# Patient Record
Sex: FEMALE | Race: ASIAN | NOT HISPANIC OR LATINO | URBAN - METROPOLITAN AREA
[De-identification: names, ages, dates, MRNs, and addresses within clinical notes are randomized per-mention and may not be internally consistent; named-entity substitution may affect disease eponyms.]

---

## 2022-04-13 ENCOUNTER — OUTPATIENT (OUTPATIENT)
Dept: INPATIENT UNIT | Facility: HOSPITAL | Age: 32
LOS: 1 days | Discharge: HOME | End: 2022-04-13
Payer: MEDICAID

## 2022-04-13 ENCOUNTER — EMERGENCY (EMERGENCY)
Facility: HOSPITAL | Age: 32
LOS: 1 days | Discharge: STILL ADMITTED OR ADMINISTRATIVE | End: 2022-04-13
Admitting: EMERGENCY MEDICINE
Payer: SELF-PAY

## 2022-04-13 VITALS — SYSTOLIC BLOOD PRESSURE: 113 MMHG | HEART RATE: 83 BPM | DIASTOLIC BLOOD PRESSURE: 74 MMHG

## 2022-04-13 VITALS — DIASTOLIC BLOOD PRESSURE: 67 MMHG | SYSTOLIC BLOOD PRESSURE: 176 MMHG | HEART RATE: 100 BPM

## 2022-04-13 VITALS
TEMPERATURE: 99 F | SYSTOLIC BLOOD PRESSURE: 117 MMHG | DIASTOLIC BLOOD PRESSURE: 70 MMHG | HEART RATE: 94 BPM | RESPIRATION RATE: 18 BRPM | OXYGEN SATURATION: 100 %

## 2022-04-13 DIAGNOSIS — Z3A.00 WEEKS OF GESTATION OF PREGNANCY NOT SPECIFIED: ICD-10-CM

## 2022-04-13 DIAGNOSIS — O26.899 OTHER SPECIFIED PREGNANCY RELATED CONDITIONS, UNSPECIFIED TRIMESTER: ICD-10-CM

## 2022-04-13 LAB
APPEARANCE UR: ABNORMAL
BACTERIA # UR AUTO: ABNORMAL
BILIRUB UR-MCNC: NEGATIVE — SIGNIFICANT CHANGE UP
COLOR SPEC: COLORLESS — SIGNIFICANT CHANGE UP
DIFF PNL FLD: NEGATIVE — SIGNIFICANT CHANGE UP
EPI CELLS # UR: 8 /HPF — HIGH (ref 0–5)
GLUCOSE UR QL: NEGATIVE — SIGNIFICANT CHANGE UP
HYALINE CASTS # UR AUTO: 0 /LPF — SIGNIFICANT CHANGE UP (ref 0–7)
KETONES UR-MCNC: NEGATIVE — SIGNIFICANT CHANGE UP
LEUKOCYTE ESTERASE UR-ACNC: NEGATIVE — SIGNIFICANT CHANGE UP
NITRITE UR-MCNC: NEGATIVE — SIGNIFICANT CHANGE UP
PH UR: 7 — SIGNIFICANT CHANGE UP (ref 5–8)
PROT UR-MCNC: NEGATIVE — SIGNIFICANT CHANGE UP
RBC CASTS # UR COMP ASSIST: 1 /HPF — SIGNIFICANT CHANGE UP (ref 0–4)
SP GR SPEC: 1.01 — SIGNIFICANT CHANGE UP (ref 1–1.05)
UROBILINOGEN FLD QL: SIGNIFICANT CHANGE UP
WBC UR QL: 2 /HPF — SIGNIFICANT CHANGE UP (ref 0–5)

## 2022-04-13 PROCEDURE — L9996: CPT

## 2022-04-13 NOTE — ED ADULT TRIAGE NOTE - CHIEF COMPLAINT QUOTE
Pt is 22 weeks pregnant, BRE 08/14. is c/o lower abdominal pain and not feeling fetal movements for past two days. Called  L&D and pt is to be seen upstairs.

## 2022-04-13 NOTE — OB PROVIDER TRIAGE NOTE - ADDITIONAL INSTRUCTIONS
d/w Dr Lopez discharge home resolved decreased fetal movement @ 22.3 wks  patient advised not to fast  maternal and fetal surveillance reassuring  rest activity as tolerated  increase water intake  keep all OB appointments  return for vaginal bleeding leakage of fluid decreased fetal movement or any concerns   labor precautions instructed  v/w discharge instructions given with verbal understanding by patient

## 2022-04-13 NOTE — OB PROVIDER TRIAGE NOTE - NSHPPHYSICALEXAM_GEN_ALL_CORE
abd soft gravid NT  LS clear bilaterally  Cv RRR  TAS:  low lying placenta  +FM +FHR  MAVIS: MFP: WNL 5  EFW: 345g/12oz  toco: no contractions no pain 0/10  Vital Signs Last 24 Hrs  T(C): 36.9 (13 Apr 2022 22:24), Max: 37.2 (13 Apr 2022 21:37)  T(F): 98.4 (13 Apr 2022 22:24), Max: 99 (13 Apr 2022 21:37)  HR: 83 (13 Apr 2022 23:50) (75 - 100)  BP: 113/74 (13 Apr 2022 23:50) (110/71 - 176/67)  BP(mean): --  RR: 16 (13 Apr 2022 22:24) (16 - 18)  SpO2: 100% (13 Apr 2022 21:37) (100% - 100%)

## 2022-04-13 NOTE — OB RN TRIAGE NOTE - FALL HARM RISK - UNIVERSAL INTERVENTIONS
Bed in lowest position, wheels locked, appropriate side rails in place/Call bell, personal items and telephone in reach/Instruct patient to call for assistance before getting out of bed or chair/Non-slip footwear when patient is out of bed/[place] to call system/Physically safe environment - no spills, clutter or unnecessary equipment/Purposeful Proactive Rounding/Room/bathroom lighting operational, light cord in reach

## 2022-04-13 NOTE — OB PROVIDER TRIAGE NOTE - NSHPLABSRESULTS_GEN_ALL_CORE
Urinalysis Basic - ( 2022 22:51 )    Color: Colorless / Appearance: Slightly Turbid / S.006 / pH: x  Gluc: x / Ketone: Negative  / Bili: Negative / Urobili: <2 mg/dL   Blood: x / Protein: Negative / Nitrite: Negative   Leuk Esterase: Negative / RBC: 1 /HPF / WBC 2 /HPF   Sq Epi: x / Non Sq Epi: 8 /HPF / Bacteria: Few

## 2022-04-13 NOTE — OB PROVIDER TRIAGE NOTE - HISTORY OF PRESENT ILLNESS
31 yo  @ 22.3 wks c/o decreased fetal movement x 2 days pt is fasting. pt reports pain at umbilicus x 5 minutes 2 days ago, denies pain, cramping, or tight abdomen. denies lof or vb. denies fever chills dysuria ha n/v new swelling epigastric pain vision changes cp sob  or cough. last saw OB 3/31. AP course complciated by low lying placenta- no bleeding events. next sono .     meds: PNV iron  all: denies  PMH: denies  PSH: denies  gyn hx: denies  ob hx: denies

## 2022-04-14 PROCEDURE — 76819 FETAL BIOPHYS PROFIL W/O NST: CPT | Mod: 26

## 2022-04-14 PROCEDURE — 99203 OFFICE O/P NEW LOW 30 MIN: CPT

## 2022-04-14 PROCEDURE — 76816 OB US FOLLOW-UP PER FETUS: CPT | Mod: 26

## 2022-07-13 ENCOUNTER — OUTPATIENT (OUTPATIENT)
Dept: OUTPATIENT SERVICES | Facility: HOSPITAL | Age: 32
LOS: 1 days | Discharge: HOME | End: 2022-07-13

## 2022-07-13 ENCOUNTER — EMERGENCY (EMERGENCY)
Facility: HOSPITAL | Age: 32
LOS: 1 days | Discharge: STILL ADMITTED OR ADMINISTRATIVE | End: 2022-07-13
Admitting: EMERGENCY MEDICINE

## 2022-07-13 VITALS — HEART RATE: 81 BPM | OXYGEN SATURATION: 99 %

## 2022-07-13 VITALS — SYSTOLIC BLOOD PRESSURE: 103 MMHG | HEART RATE: 77 BPM | DIASTOLIC BLOOD PRESSURE: 58 MMHG

## 2022-07-13 VITALS
OXYGEN SATURATION: 99 % | RESPIRATION RATE: 18 BRPM | TEMPERATURE: 98 F | HEART RATE: 68 BPM | DIASTOLIC BLOOD PRESSURE: 59 MMHG | SYSTOLIC BLOOD PRESSURE: 101 MMHG

## 2022-07-13 DIAGNOSIS — O26.899 OTHER SPECIFIED PREGNANCY RELATED CONDITIONS, UNSPECIFIED TRIMESTER: ICD-10-CM

## 2022-07-13 DIAGNOSIS — Z3A.00 WEEKS OF GESTATION OF PREGNANCY NOT SPECIFIED: ICD-10-CM

## 2022-07-13 PROBLEM — Z78.9 OTHER SPECIFIED HEALTH STATUS: Chronic | Status: ACTIVE | Noted: 2022-04-13

## 2022-07-13 LAB
APPEARANCE UR: CLEAR — SIGNIFICANT CHANGE UP
BILIRUB UR-MCNC: NEGATIVE — SIGNIFICANT CHANGE UP
COLOR SPEC: SIGNIFICANT CHANGE UP
DIFF PNL FLD: NEGATIVE — SIGNIFICANT CHANGE UP
GLUCOSE BLDC GLUCOMTR-MCNC: 132 MG/DL — HIGH (ref 70–99)
GLUCOSE BLDC GLUCOMTR-MCNC: 134 MG/DL — HIGH (ref 70–99)
GLUCOSE UR QL: NEGATIVE — SIGNIFICANT CHANGE UP
KETONES UR-MCNC: NEGATIVE — SIGNIFICANT CHANGE UP
LEUKOCYTE ESTERASE UR-ACNC: NEGATIVE — SIGNIFICANT CHANGE UP
NITRITE UR-MCNC: NEGATIVE — SIGNIFICANT CHANGE UP
PH UR: 6.5 — SIGNIFICANT CHANGE UP (ref 5–8)
PROT UR-MCNC: NEGATIVE — SIGNIFICANT CHANGE UP
SP GR SPEC: 1.01 — SIGNIFICANT CHANGE UP (ref 1–1.05)
UROBILINOGEN FLD QL: SIGNIFICANT CHANGE UP

## 2022-07-13 PROCEDURE — 99214 OFFICE O/P EST MOD 30 MIN: CPT

## 2022-07-13 PROCEDURE — 76818 FETAL BIOPHYS PROFILE W/NST: CPT | Mod: 26

## 2022-07-13 PROCEDURE — L9996: CPT

## 2022-07-13 RX ORDER — SODIUM CHLORIDE 9 MG/ML
1000 INJECTION, SOLUTION INTRAVENOUS ONCE
Refills: 0 | Status: COMPLETED | OUTPATIENT
Start: 2022-07-13 | End: 2022-07-13

## 2022-07-13 RX ORDER — SODIUM CHLORIDE 9 MG/ML
1000 INJECTION, SOLUTION INTRAVENOUS
Refills: 0 | Status: DISCONTINUED | OUTPATIENT
Start: 2022-07-13 | End: 2022-07-27

## 2022-07-13 RX ORDER — SIMETHICONE 80 MG/1
160 TABLET, CHEWABLE ORAL ONCE
Refills: 0 | Status: COMPLETED | OUTPATIENT
Start: 2022-07-13 | End: 2022-07-13

## 2022-07-13 RX ORDER — ACETAMINOPHEN 500 MG
975 TABLET ORAL ONCE
Refills: 0 | Status: COMPLETED | OUTPATIENT
Start: 2022-07-13 | End: 2022-07-13

## 2022-07-13 RX ADMIN — Medication 975 MILLIGRAM(S): at 07:57

## 2022-07-13 RX ADMIN — SODIUM CHLORIDE 125 MILLILITER(S): 9 INJECTION, SOLUTION INTRAVENOUS at 06:29

## 2022-07-13 RX ADMIN — SODIUM CHLORIDE 1000 MILLILITER(S): 9 INJECTION, SOLUTION INTRAVENOUS at 05:53

## 2022-07-13 RX ADMIN — SIMETHICONE 160 MILLIGRAM(S): 80 TABLET, CHEWABLE ORAL at 07:56

## 2022-07-13 NOTE — OB PROVIDER TRIAGE NOTE - HISTORY OF PRESENT ILLNESS
ID [id number]   32 year old  at 35.3 presents with complaints of painful contractions reports pain 10/10. Denies LOF, denies VB, reports fetal movement. Patient states she has GDMA1. Denies dysuria, denies increased urinary frequency.    PMH: Denies  PSH: Denies  Allergies: Denies   Denies hx of anxiety, depression   denies use of etoh, drugs, tobacco during pregnancy   ID [id number]   32 year old  at 35.3 presents with complaints of painful contractions reports pain 10/10. Denies LOF, denies VB, reports fetal movement. Patient states she has GDMA1. Denies dysuria, denies increased urinary frequency.    Last bowel movement yesterday, last passed gas this AM.    PMH: Denies  PSH: Denies  Allergies: Denies   Denies hx of anxiety, depression   denies use of etoh, drugs, tobacco during pregnancy   ID [id number]   32 year old  at 35.3 presents with complaints of painful contractions reports pain 10/10. Denies LOF, denies VB, reports fetal movement. Patient states she has GDMA1. Denies dysuria, denies increased urinary frequency.    Last bowel movement yesterday, last passed flatus this AM.    PMH: Denies  PSH: Denies  Allergies: Denies   Denies hx of anxiety, depression   denies use of etoh, drugs, tobacco during pregnancy

## 2022-07-13 NOTE — ED ADULT TRIAGE NOTE - CHIEF COMPLAINT QUOTE
Pt arrives with EMS for abd pain. 35wks, BRE , , -ROM, no urge to push. Patient with gestational diabetes, . Follows with AMEYA Phillips. To be seen in L&D.

## 2022-07-13 NOTE — OB PROVIDER TRIAGE NOTE - NSOBPROVIDERNOTE_OBGYN_ALL_OB_FT
NST in progress       0515: Dr. Ferraro at bedside   VE 0/50/-3  For UA   for 1L bolus of LR.   repeat , pt reports eating rice and meat at midnight - Dr. Ferraro aware no new orders at this time   NST continues will monitor. NST in progress       0515: Dr. Ferraro at bedside   VE 0/50/-3  For UA   for 1L bolus of LR.   repeat , pt reports eating rice and meat at midnight - Dr. Ferraro aware no new orders at this time   NST continues will monitor.    0630: patient reports feeling a little better but still has pain after bolus.  D/w Dr. Ferraro, patient to be reexamined at 0730. NST in progress       0515: Dr. Ferraro at bedside   VE 0/50/-3  For UA   for 1L bolus of LR.   repeat , pt reports eating rice and meat at midnight - Dr. Ferraro aware no new orders at this time   NST continues will monitor.    0630: patient reports feeling a little better but still states she has pain after LR bolus.   D/w Dr. Ferraro, patient to be reexamined at 0730.    report given for change of shift NST in progress       0515: Dr. Ferraro at bedside   VE 0/50/-3  For UA   for 1L bolus of LR.   repeat , pt reports eating rice and meat at midnight - Dr. Ferraro aware no new orders at this time   NST continues will monitor.    0630: patient reports feeling a little better but still states she has pain after LR bolus.   D/w Dr. Ferraro, patient to be reexamined at 0730.    report given for change of shift    0730: exam repeated 1/50/-3  Tylenol and simethicone given NST in progress       0515: Dr. Ferraro at bedside   VE 0/50/-3  For UA   for 1L bolus of LR.   repeat , pt reports eating rice and meat at midnight - Dr. Ferraro aware no new orders at this time   NST continues will monitor.    0630: patient reports feeling a little better but still states she has pain after LR bolus.   D/w Dr. Ferraro, patient to be reexamined at 0730.    report given for change of shift    0730: exam repeated 1/50/-3  0800: Tylenol and simethicone given NST in progress       0515: Dr. Ferraro at bedside   VE 0/50/-3  For UA   for 1L bolus of LR.   repeat , pt reports eating rice and meat at midnight - Dr. Ferraro aware no new orders at this time   NST continues will monitor.    0630: patient reports feeling a little better but still states she has pain after LR bolus.   D/w Dr. Ferraro, patient to be reexamined at 0730.    report given for change of shift    0730: exam repeated 1/50/-3  0800: Tylenol and simethicone given    0845: pt feeling better, pain has decreased  - no contractions  - Dr. Bernal at bedside NST in progress       0515: Dr. Ferraro at bedside   VE 0/50/-3  For UA   for 1L bolus of LR.   repeat , pt reports eating rice and meat at midnight - Dr. Ferraro aware no new orders at this time   NST continues will monitor.    0630: patient reports feeling a little better but still states she has pain after LR bolus.   D/w Dr. Ferraro, patient to be reexamined at 0730.    report given for change of shift    0730: VE repeated 1/50/-3, pain unchanged  0800: Tylenol and simethicone given    0845: pt feeling better after medication, pain has decreased  - No contractions on NST  - Dr. Bernal at bedside  - Pt reexamined, VE unchanged at 1/50/-3  - No evidence of PTL  - Cleared for discharge by Dr. Bernal    - Patient stable for discharge home with adequate outpatient follow up  - Instructed patient to follow up with OB/GYN on 7/16 as scheduled  - Educated and discussed prelabor precautions  - Educated and discussed concerning signs/symptoms to call OB/GYN and return to L&D including but not limited to vaginal bleeding, leakage of fluid, painful contractions, decreased fetal movement, fever/chills, shortness of breath, chest pain, rash, difficulty ambulating, nausea/vomiting/diarrhea, worsening of symptoms  - Patient states she understands and agrees with assessment and plan, all questions answered

## 2022-07-13 NOTE — OB PROVIDER TRIAGE NOTE - NSHPPHYSICALEXAM_GEN_ALL_CORE
Vital Signs Last 24 Hrs  T(C): 37 (13 Jul 2022 04:41), Max: 37.0 (13 Jul 2022 04:39)  T(F): 98.6 (13 Jul 2022 04:41), Max: 98.6 (13 Jul 2022 04:39)  HR: 77 (13 Jul 2022 04:41) (68 - 77)  BP: 103/58 (13 Jul 2022 04:41) (101/59 - 103/58)  BP(mean): --  RR: 16 (13 Jul 2022 04:41) (16 - 18)  SpO2: 99% (13 Jul 2022 04:11) (99% - 99%)    Assessment reveals VSS  Abdomen soft, NT, gravid  Cat 1 tracing, [id number] moderate variability with accels, spontaneous deceleration noted 0449, no reoccurring decels. irregular contractions   Transabdominal Ultrasound-anterior placenta, MAVIS 15.33, cephalic presentation   Vaginal Exam- 0/50/-3   A&Ox3  Lungs- clear bilateral  Heart- normal rate and rhythm Vital Signs Last 24 Hrs  T(C): 37 (13 Jul 2022 04:41), Max: 37.0 (13 Jul 2022 04:39)  T(F): 98.6 (13 Jul 2022 04:41), Max: 98.6 (13 Jul 2022 04:39)  HR: 77 (13 Jul 2022 04:41) (68 - 77)  BP: 103/58 (13 Jul 2022 04:41) (101/59 - 103/58)  BP(mean): --  RR: 16 (13 Jul 2022 04:41) (16 - 18)  SpO2: 99% (13 Jul 2022 04:11) (99% - 99%)    Assessment reveals VSS  Abdomen soft, NT, gravid  Cat 1 tracing,  moderate variability with accels, spontaneous deceleration noted 0449, no reoccurring decels. irregular contractions   Transabdominal Ultrasound-anterior placenta, MAVIS 15.33, cephalic presentation, bpp 8/8   Vaginal Exam- 0/50/-3   A&Ox3  Lungs- clear bilateral  Heart- normal rate and rhythm

## 2022-07-13 NOTE — OB PROVIDER TRIAGE NOTE - ADDITIONAL INSTRUCTIONS
- Patient stable for discharge home with adequate outpatient follow up  - Instructed patient to follow up with OB/GYN on 7/16 as scheduled  - Educated and discussed prelabor precautions  - Educated and discussed concerning signs/symptoms to call OB/GYN and return to L&D including but not limited to vaginal bleeding, leakage of fluid, painful contractions, decreased fetal movement, fever/chills, shortness of breath, chest pain, rash, difficulty ambulating, nausea/vomiting/diarrhea, worsening of symptoms  - Patient states she understands and agrees with assessment and plan, all questions answered

## 2022-07-13 NOTE — OB RN TRIAGE NOTE - FALL HARM RISK - UNIVERSAL INTERVENTIONS
Bed in lowest position, wheels locked, appropriate side rails in place/Call bell, personal items and telephone in reach/Instruct patient to call for assistance before getting out of bed or chair/Non-slip footwear when patient is out of bed/[place] to call system/Physically safe environment - no spills, clutter or unnecessary equipment/Purposeful Proactive Rounding/Room/bathroom lighting operational, light cord in reach

## 2022-07-13 NOTE — OB PROVIDER TRIAGE NOTE - NSHPLABSRESULTS_GEN_ALL_CORE
Urinalysis Basic - ( 2022 05:45 )    Color: Light Yellow / Appearance: Clear / S.009 / pH: x  Gluc: x / Ketone: Negative  / Bili: Negative / Urobili: <2 mg/dL   Blood: x / Protein: Negative / Nitrite: Negative   Leuk Esterase: Negative / RBC: x / WBC x   Sq Epi: x / Non Sq Epi: x / Bacteria: x

## 2022-07-13 NOTE — OB PROVIDER TRIAGE NOTE - PLAN OF CARE
- no evidence of  labor  - pt discharged  - continue with scheduled appt on   -  labor precautions given

## 2022-07-21 ENCOUNTER — INPATIENT (INPATIENT)
Facility: HOSPITAL | Age: 32
LOS: 2 days | Discharge: HOME | End: 2022-07-24
Attending: SPECIALIST | Admitting: SPECIALIST

## 2022-07-21 VITALS
DIASTOLIC BLOOD PRESSURE: 60 MMHG | RESPIRATION RATE: 17 BRPM | TEMPERATURE: 99 F | HEART RATE: 99 BPM | SYSTOLIC BLOOD PRESSURE: 99 MMHG

## 2022-07-21 DIAGNOSIS — O26.899 OTHER SPECIFIED PREGNANCY RELATED CONDITIONS, UNSPECIFIED TRIMESTER: ICD-10-CM

## 2022-07-21 DIAGNOSIS — Z3A.00 WEEKS OF GESTATION OF PREGNANCY NOT SPECIFIED: ICD-10-CM

## 2022-07-21 NOTE — OB RN TRIAGE NOTE - FALL HARM RISK - UNIVERSAL INTERVENTIONS
Bed in lowest position, wheels locked, appropriate side rails in place/Call bell, personal items and telephone in reach/Instruct patient to call for assistance before getting out of bed or chair/Non-slip footwear when patient is out of bed/[place] to call system/Physically safe environment - no spills, clutter or unnecessary equipment/Purposeful Proactive Rounding/Room/bathroom lighting operational, light cord in reach

## 2022-07-22 DIAGNOSIS — O24.419 GESTATIONAL DIABETES MELLITUS IN PREGNANCY, UNSPECIFIED CONTROL: ICD-10-CM

## 2022-07-22 DIAGNOSIS — O24.410 GESTATIONAL DIABETES MELLITUS IN PREGNANCY, DIET CONTROLLED: ICD-10-CM

## 2022-07-22 LAB
BASOPHILS # BLD AUTO: 0.01 K/UL — SIGNIFICANT CHANGE UP (ref 0–0.2)
BASOPHILS NFR BLD AUTO: 0.1 % — SIGNIFICANT CHANGE UP (ref 0–2)
BLD GP AB SCN SERPL QL: NEGATIVE — SIGNIFICANT CHANGE UP
COVID-19 SPIKE DOMAIN AB INTERP: POSITIVE
COVID-19 SPIKE DOMAIN ANTIBODY RESULT: >250 U/ML — HIGH
EOSINOPHIL # BLD AUTO: 0.01 K/UL — SIGNIFICANT CHANGE UP (ref 0–0.5)
EOSINOPHIL NFR BLD AUTO: 0.1 % — SIGNIFICANT CHANGE UP (ref 0–6)
GLUCOSE BLDC GLUCOMTR-MCNC: 85 MG/DL — SIGNIFICANT CHANGE UP (ref 70–99)
HCT VFR BLD CALC: 32.7 % — LOW (ref 34.5–45)
HGB BLD-MCNC: 11.2 G/DL — LOW (ref 11.5–15.5)
IANC: 6.96 K/UL — SIGNIFICANT CHANGE UP (ref 1.8–7.4)
IMM GRANULOCYTES NFR BLD AUTO: 0.5 % — SIGNIFICANT CHANGE UP (ref 0–1.5)
LYMPHOCYTES # BLD AUTO: 0.97 K/UL — LOW (ref 1–3.3)
LYMPHOCYTES # BLD AUTO: 11.5 % — LOW (ref 13–44)
MCHC RBC-ENTMCNC: 30.6 PG — SIGNIFICANT CHANGE UP (ref 27–34)
MCHC RBC-ENTMCNC: 34.3 GM/DL — SIGNIFICANT CHANGE UP (ref 32–36)
MCV RBC AUTO: 89.3 FL — SIGNIFICANT CHANGE UP (ref 80–100)
MONOCYTES # BLD AUTO: 0.48 K/UL — SIGNIFICANT CHANGE UP (ref 0–0.9)
MONOCYTES NFR BLD AUTO: 5.7 % — SIGNIFICANT CHANGE UP (ref 2–14)
NEUTROPHILS # BLD AUTO: 6.96 K/UL — SIGNIFICANT CHANGE UP (ref 1.8–7.4)
NEUTROPHILS NFR BLD AUTO: 82.1 % — HIGH (ref 43–77)
NRBC # BLD: 0 /100 WBCS — SIGNIFICANT CHANGE UP
NRBC # FLD: 0 K/UL — SIGNIFICANT CHANGE UP
PLATELET # BLD AUTO: 217 K/UL — SIGNIFICANT CHANGE UP (ref 150–400)
RBC # BLD: 3.66 M/UL — LOW (ref 3.8–5.2)
RBC # FLD: 13.6 % — SIGNIFICANT CHANGE UP (ref 10.3–14.5)
RH IG SCN BLD-IMP: POSITIVE — SIGNIFICANT CHANGE UP
RH IG SCN BLD-IMP: POSITIVE — SIGNIFICANT CHANGE UP
SARS-COV-2 IGG+IGM SERPL QL IA: >250 U/ML — HIGH
SARS-COV-2 IGG+IGM SERPL QL IA: POSITIVE
SARS-COV-2 RNA SPEC QL NAA+PROBE: SIGNIFICANT CHANGE UP
T PALLIDUM AB TITR SER: NEGATIVE — SIGNIFICANT CHANGE UP
WBC # BLD: 8.47 K/UL — SIGNIFICANT CHANGE UP (ref 3.8–10.5)
WBC # FLD AUTO: 8.47 K/UL — SIGNIFICANT CHANGE UP (ref 3.8–10.5)

## 2022-07-22 RX ORDER — SODIUM CHLORIDE 9 MG/ML
1000 INJECTION, SOLUTION INTRAVENOUS
Refills: 0 | Status: DISCONTINUED | OUTPATIENT
Start: 2022-07-22 | End: 2022-07-22

## 2022-07-22 RX ORDER — OXYCODONE HYDROCHLORIDE 5 MG/1
5 TABLET ORAL
Refills: 0 | Status: DISCONTINUED | OUTPATIENT
Start: 2022-07-22 | End: 2022-07-24

## 2022-07-22 RX ORDER — SIMETHICONE 80 MG/1
80 TABLET, CHEWABLE ORAL EVERY 4 HOURS
Refills: 0 | Status: DISCONTINUED | OUTPATIENT
Start: 2022-07-22 | End: 2022-07-24

## 2022-07-22 RX ORDER — DIBUCAINE 1 %
1 OINTMENT (GRAM) RECTAL EVERY 6 HOURS
Refills: 0 | Status: DISCONTINUED | OUTPATIENT
Start: 2022-07-22 | End: 2022-07-24

## 2022-07-22 RX ORDER — HYDROCORTISONE 1 %
1 OINTMENT (GRAM) TOPICAL EVERY 6 HOURS
Refills: 0 | Status: DISCONTINUED | OUTPATIENT
Start: 2022-07-22 | End: 2022-07-24

## 2022-07-22 RX ORDER — SODIUM CHLORIDE 9 MG/ML
3 INJECTION INTRAMUSCULAR; INTRAVENOUS; SUBCUTANEOUS EVERY 8 HOURS
Refills: 0 | Status: DISCONTINUED | OUTPATIENT
Start: 2022-07-22 | End: 2022-07-24

## 2022-07-22 RX ORDER — OXYTOCIN 10 UNIT/ML
333.33 VIAL (ML) INJECTION
Qty: 20 | Refills: 0 | Status: DISCONTINUED | OUTPATIENT
Start: 2022-07-22 | End: 2022-07-23

## 2022-07-22 RX ORDER — DIPHENHYDRAMINE HCL 50 MG
25 CAPSULE ORAL EVERY 6 HOURS
Refills: 0 | Status: DISCONTINUED | OUTPATIENT
Start: 2022-07-22 | End: 2022-07-24

## 2022-07-22 RX ORDER — ACETAMINOPHEN 500 MG
975 TABLET ORAL
Refills: 0 | Status: DISCONTINUED | OUTPATIENT
Start: 2022-07-22 | End: 2022-07-24

## 2022-07-22 RX ORDER — AER TRAVELER 0.5 G/1
1 SOLUTION RECTAL; TOPICAL EVERY 4 HOURS
Refills: 0 | Status: DISCONTINUED | OUTPATIENT
Start: 2022-07-22 | End: 2022-07-24

## 2022-07-22 RX ORDER — FAMOTIDINE 10 MG/ML
20 INJECTION INTRAVENOUS DAILY
Refills: 0 | Status: DISCONTINUED | OUTPATIENT
Start: 2022-07-22 | End: 2022-07-24

## 2022-07-22 RX ORDER — OXYCODONE HYDROCHLORIDE 5 MG/1
5 TABLET ORAL ONCE
Refills: 0 | Status: DISCONTINUED | OUTPATIENT
Start: 2022-07-22 | End: 2022-07-24

## 2022-07-22 RX ORDER — SODIUM CHLORIDE 9 MG/ML
1000 INJECTION INTRAMUSCULAR; INTRAVENOUS; SUBCUTANEOUS
Refills: 0 | Status: DISCONTINUED | OUTPATIENT
Start: 2022-07-22 | End: 2022-07-22

## 2022-07-22 RX ORDER — TETANUS TOXOID, REDUCED DIPHTHERIA TOXOID AND ACELLULAR PERTUSSIS VACCINE, ADSORBED 5; 2.5; 8; 8; 2.5 [IU]/.5ML; [IU]/.5ML; UG/.5ML; UG/.5ML; UG/.5ML
0.5 SUSPENSION INTRAMUSCULAR ONCE
Refills: 0 | Status: DISCONTINUED | OUTPATIENT
Start: 2022-07-22 | End: 2022-07-24

## 2022-07-22 RX ORDER — MAGNESIUM HYDROXIDE 400 MG/1
30 TABLET, CHEWABLE ORAL
Refills: 0 | Status: DISCONTINUED | OUTPATIENT
Start: 2022-07-22 | End: 2022-07-24

## 2022-07-22 RX ORDER — KETOROLAC TROMETHAMINE 30 MG/ML
30 SYRINGE (ML) INJECTION ONCE
Refills: 0 | Status: DISCONTINUED | OUTPATIENT
Start: 2022-07-22 | End: 2022-07-22

## 2022-07-22 RX ORDER — LANOLIN
1 OINTMENT (GRAM) TOPICAL EVERY 6 HOURS
Refills: 0 | Status: DISCONTINUED | OUTPATIENT
Start: 2022-07-22 | End: 2022-07-24

## 2022-07-22 RX ORDER — IBUPROFEN 200 MG
600 TABLET ORAL EVERY 6 HOURS
Refills: 0 | Status: COMPLETED | OUTPATIENT
Start: 2022-07-22 | End: 2023-06-20

## 2022-07-22 RX ORDER — PRAMOXINE HYDROCHLORIDE 150 MG/15G
1 AEROSOL, FOAM RECTAL EVERY 4 HOURS
Refills: 0 | Status: DISCONTINUED | OUTPATIENT
Start: 2022-07-22 | End: 2022-07-24

## 2022-07-22 RX ORDER — BENZOCAINE 10 %
1 GEL (GRAM) MUCOUS MEMBRANE EVERY 6 HOURS
Refills: 0 | Status: DISCONTINUED | OUTPATIENT
Start: 2022-07-22 | End: 2022-07-24

## 2022-07-22 RX ORDER — IBUPROFEN 200 MG
600 TABLET ORAL EVERY 6 HOURS
Refills: 0 | Status: DISCONTINUED | OUTPATIENT
Start: 2022-07-22 | End: 2022-07-24

## 2022-07-22 RX ADMIN — SODIUM CHLORIDE 3 MILLILITER(S): 9 INJECTION INTRAMUSCULAR; INTRAVENOUS; SUBCUTANEOUS at 13:01

## 2022-07-22 RX ADMIN — Medication 600 MILLIGRAM(S): at 18:04

## 2022-07-22 RX ADMIN — SODIUM CHLORIDE 125 MILLILITER(S): 9 INJECTION INTRAMUSCULAR; INTRAVENOUS; SUBCUTANEOUS at 04:47

## 2022-07-22 RX ADMIN — FAMOTIDINE 20 MILLIGRAM(S): 10 INJECTION INTRAVENOUS at 11:08

## 2022-07-22 RX ADMIN — Medication 975 MILLIGRAM(S): at 10:45

## 2022-07-22 RX ADMIN — Medication 975 MILLIGRAM(S): at 10:29

## 2022-07-22 RX ADMIN — SODIUM CHLORIDE 3 MILLILITER(S): 9 INJECTION INTRAMUSCULAR; INTRAVENOUS; SUBCUTANEOUS at 22:29

## 2022-07-22 RX ADMIN — Medication 30 MILLIGRAM(S): at 05:00

## 2022-07-22 RX ADMIN — Medication 1000 MILLIUNIT(S)/MIN: at 04:47

## 2022-07-22 RX ADMIN — Medication 600 MILLIGRAM(S): at 23:32

## 2022-07-22 RX ADMIN — Medication 600 MILLIGRAM(S): at 19:00

## 2022-07-22 NOTE — OB PROVIDER DELIVERY SUMMARY - NSSELHIDDEN_OBGYN_ALL_OB_FT
[NS_DeliveryAttending1_OBGYN_ALL_OB_FT:[id number]],[NS_DeliveryRN_OBGYN_ALL_OB_FT:[id number]],[NS_DeliveryAssist1_OBGYN_ALL_OB_FT:[id number]]

## 2022-07-22 NOTE — OB PROVIDER TRIAGE NOTE - HISTORY OF PRESENT ILLNESS
ID [id number]   32 year old  at 36 4/7 presents with complaints of painful contractions reports pain 10/10. Denies LOF, denies VB, reports fetal movement. Patient states she has GDMA1. Denies dysuria, denies increased urinary frequency.    Last bowel movement yesterday, last passed flatus this AM.    PMH: Denies  PSH: Denies  Allergies: Denies   Denies hx of anxiety, depression   denies use of etoh, drugs, tobacco during pregnancy     32 year old  at 36 4/7 presents with complaints of painful contractions reports pain 10/10. Denies LOF, denies VB, reports fetal movement. Patient states she has GDMA1. Denies dysuria, denies increased urinary frequency.    Last bowel movement yesterday, last passed flatus this AM.    PMH: Denies  PSH: Denies  Allergies: Denies   Denies hx of anxiety, depression   denies use of etoh, drugs, tobacco during pregnancy   32 year old  at 36 4/7 presents with complaints of painful contractions  Q 5-7 minutes reports pain 10/10. Denies dysuria, denies increased urinary frequency.     leakage of fluid, and reports fetal movement.  Denies fever, chills, headaches, changes in vision, chest pain, palpitations, shortness of breath, cough, nausea, vomiting, diarrhea, constipation, urinary symptoms, edema.    Prenatal care with Dr. Phillips  Prenatal course is complicated GDMA-1    GBS status is negative  2022  Patient denies signs and symptoms of COVID 19; denies symptomatic illness; fully vaccinated.    No adverse reactions to anesthesia, no objections to blood transfusions if clinically indicated.  OB hx: primigravida  PMH: Denies  PSH: Denies  Allergies: Denies   Denies hx of anxiety, depression   denies use of etoh, drugs, tobacco during pregnancy

## 2022-07-22 NOTE — OB PROVIDER TRIAGE NOTE - NSHPPHYSICALEXAM_GEN_ALL_CORE
Assessment reveals VSS  Abdomen soft, NT, gravid  Cat 1 tracing,  moderate variability with accels, , no reoccurring decels. irregular contractions   Transabdominal Ultrasound-anterior placenta, MAVIS 15.33, cephalic presentation, bpp 8/8   Vaginal Exam- 0/50/-3   A&Ox3  Lungs- clear bilateral  Heart- normal rate and rhythm Gen: NAD  Head: NC/AT  Cardio: RRR  Resp: CTABL, no wheezing  Abdomen: Soft, Non tender  Extremities: No LE edema bilaterally    NST and BPP done to assess fetal surveillance and results as follows:  NST-->FHR: 145 HR baseline, moderate variability, accelerations present, no decelerations, Category 1.  Rhome: Contractions present, irregular,    BPP:  vertex confirmed by bedside sonogram    SVE: 5/70/-3

## 2022-07-22 NOTE — OB RN PATIENT PROFILE - FALL HARM RISK - UNIVERSAL INTERVENTIONS
Bed in lowest position, wheels locked, appropriate side rails in place/Call bell, personal items and telephone in reach/Instruct patient to call for assistance before getting out of bed or chair/Non-slip footwear when patient is out of bed/[place] to call system/Physically safe environment - no spills, clutter or unnecessary equipment/Purposeful Proactive Rounding/Room/bathroom lighting operational, light cord in reach

## 2022-07-22 NOTE — DISCHARGE NOTE OB - CARE PROVIDER_API CALL
Kailey Phillips  OBSTETRICS AND GYNECOLOGY  [address], [address]  [place] [address]  Phone: [phone]  Fax: [phone]  Follow Up Time:

## 2022-07-22 NOTE — DISCHARGE NOTE OB - MEDICATION SUMMARY - MEDICATIONS TO TAKE
I will START or STAY ON the medications listed below when I get home from the hospital:    ibuprofen 600 mg oral tablet  -- 1 tab(s) by mouth every 6 hours, As Needed  -- Indication: For Normal vaginal delivery    PNV Prenatal oral tablet  -- 1 tab(s) by mouth once a day  -- Indication: For Normal vaginal delivery

## 2022-07-22 NOTE — DISCHARGE NOTE OB - BRAND OF COVID-19 VACCINATION
Date:  19  RE: Ernestina Fonseca    : 1984  Estimated Date of Delivery: 3/29/19  EGA: 38w4d  OB/GYN: [place] [facility]  Insulin controlled gestational diabetes      Blood glucose log from patient e-mail  Reports a delayed injection site reaction but does report pain with injections  Hydrocortisone cream as recommended by her OB helps with site reaction  Continued no response regarding permission from her OB to use anti-histamine orally        Current regimen:  Lantus- 84 units at bedtime, split into 2 equal doses injected in 2 different sites  2000 calorie gestational diabetes diet with 3 meals and 3 snacks  Self- monitoring blood glucose fasting and 2 hours after start of meals, unless otherwise reported with One Touch Verio meter  Advised patient to maintain no longer than an 8-10 hour fasting time frame for FBS measurement      Reports splitting Lantus dose, having site reaction to Lantus and hydrocortisone helps sometimes      Plan:   Continue current regiemn  1957SoL2b 5 4%, re-order week of 3-24-19     3-5-19 U/S showed appropriate interval fetal growth  Date due to report next:  Tuesday, 3-26-19; sooner if blood glucose values are consistently above target range      LUIGI Nunn  Diabetes Educator   [facility]abetes [facility]regnancy [facility]
Moderna dose 1 and 2

## 2022-07-22 NOTE — DISCHARGE NOTE OB - NS MD DC FALL RISK RISK
For information on Fall & Injury Prevention, visit: [email or web address] OR  [email or web address] OR  https://www.cdc.gov/steadi/patient.html

## 2022-07-22 NOTE — OB PROVIDER H&P - NSHPPHYSICALEXAM_GEN_ALL_CORE
Gen: NAD  Head: NC/AT  Cardio: RRR  Resp: CTABL, no wheezing  Abdomen: Soft, Non tender  Extremities: No LE edema bilaterally    NST and BPP done to assess fetal surveillance and results as follows:  NST-->FHR: 145 HR baseline, moderate variability, accelerations present, no decelerations, Category 1.  [place]: Contractions present, irregular,    BPP:  vertex confirmed by bedside sonogram    SVE: 5/70/-3

## 2022-07-22 NOTE — OB PROVIDER TRIAGE NOTE - NSOBPROVIDERNOTE_OBGYN_ALL_OB_FT
0100 discuss with Dr. Phillips. 32 year old  at 36 4/7 labor  0100 discuss with Dr. Phillips.  admit to Labor and delivery.   For epi PRN  routine orders  meds ordered  covid pcr sent  consents signed by patient.

## 2022-07-22 NOTE — OB PROVIDER H&P - ASSESSMENT
32 year old  at 36 4/7 labor  0100 discuss with Dr. Phillips.  admit to Labor and delivery.   For epi PRN  routine orders  meds ordered  UOZY-7-T-tubing fluids ordered  covid pcr sent  consents signed by patient.  0249 discuss with Dr. Del Rosario PGY-3    ARCHIE Truong NP

## 2022-07-22 NOTE — OB PROVIDER DELIVERY SUMMARY - NSPROVIDERDELIVERYNOTE_OBGYN_ALL_OB_FT
Spontaneous vaginal delivery of liveborn infant from OA position. Head delivered, nuchal x3 present. Cord was clamped and cut, then shoulders and body delivered easily. Infant was suctioned. No mec. Infant was passed to mother and then to peds. Apgars 9, 9. Placenta delivered intact with a 3 vessel cord. Fundal massage was given and uterine fundus was found to be firm. Vaginal exam revealed an intact cervix, vaginal walls and sulci. Patient had a 1st degree laceration in the perineum that was repaired with 2.0 chromic suture. Excellent hemostasis was noted. Patient was stable. Count was correct x 2. .    Dr. Phillips present at delivery  Maria D Reeder PGY1

## 2022-07-22 NOTE — OB NEONATOLOGY/PEDIATRICIAN DELIVERY SUMMARY - NSPEDSNEONOTESA_OBGYN_ALL_OB_FT
Called to delivery for category 2 FHT. 36.5 wk male born via  to a 31 y/o  blood type A+ mother. Maternal history of GDMA1. PNL -/-/NR/I, GBS - on . SROM at 0238 with clear fluids, approx. 1 hrs. Baby emerged vigorous, crying, was w/d/s/s with APGARS of 9/9. Nuchal x3. Mom plans to initiate breastfeeding, consents Hep B vaccine and consents circ. EOS 0.11. COVID pending. Transferred to NICU due to low birth weight. Called to delivery for category 2 FHT for this 36.5 wk male born via  to a 29 y/o  blood type A+ mother. Maternal history of GDMA1. HIV/Hep B negative, Rubella and RPR pending, GBS - on . SROM at 0238 with clear fluids, approx. 1 hrs. Baby emerged vigorous, crying, was w/d/s/s with APGARS of 9/9. Nuchal x3. Mom plans to initiate breastfeeding, consents Hep B vaccine and consents circ. EOS 0.11. COVID pending. Transferred to NICU due to low birth weight. Temperature prior to transfer was 36.5  I attended delivery with intern d/t Cat II tracing. Required routine care and will transfer to NICU d/t low birth weight. Parents were updated- NEHA Benavides-BC

## 2022-07-22 NOTE — OB RN DELIVERY SUMMARY - NSSELHIDDEN_OBGYN_ALL_OB_FT
[NS_DeliveryAttending1_OBGYN_ALL_OB_FT:[id number]],[NS_DeliveryRN_OBGYN_ALL_OB_FT:[id number]]

## 2022-07-22 NOTE — OB RN DELIVERY SUMMARY - NS_SEPSISRSKCALC_OBGYN_ALL_OB_FT
EOS calculated successfully. EOS Risk Factor: 0.5/1000 live births ([facility] national incidence); GA=36w5d; Temp=98.6; ROM=1; GBS='Negative'; Antibiotics='No antibiotics or any antibiotics < 2 hrs prior to birth'

## 2022-07-22 NOTE — DISCHARGE NOTE OB - CARE PLAN
1 Principal Discharge DX:	Normal vaginal delivery  Assessment and plan of treatment:	follow up 6 weeks / regular diet & activity as tolerate

## 2022-07-22 NOTE — DISCHARGE NOTE OB - PATIENT PORTAL LINK FT
You can access the FollowMyHealth Patient Portal offered by [facility] by registering at the following website: [email or web address]. By joining [employer]’s FollowMyHealth portal, you will also be able to view your health information using other applications (apps) compatible with our system.

## 2022-07-23 RX ADMIN — SODIUM CHLORIDE 3 MILLILITER(S): 9 INJECTION INTRAMUSCULAR; INTRAVENOUS; SUBCUTANEOUS at 04:43

## 2022-07-23 RX ADMIN — SODIUM CHLORIDE 3 MILLILITER(S): 9 INJECTION INTRAMUSCULAR; INTRAVENOUS; SUBCUTANEOUS at 17:30

## 2022-07-23 RX ADMIN — Medication 600 MILLIGRAM(S): at 12:19

## 2022-07-23 RX ADMIN — Medication 600 MILLIGRAM(S): at 18:30

## 2022-07-23 RX ADMIN — Medication 975 MILLIGRAM(S): at 22:00

## 2022-07-23 RX ADMIN — Medication 600 MILLIGRAM(S): at 06:00

## 2022-07-23 RX ADMIN — Medication 600 MILLIGRAM(S): at 00:32

## 2022-07-23 RX ADMIN — FAMOTIDINE 20 MILLIGRAM(S): 10 INJECTION INTRAVENOUS at 11:42

## 2022-07-23 RX ADMIN — Medication 975 MILLIGRAM(S): at 21:04

## 2022-07-23 RX ADMIN — Medication 600 MILLIGRAM(S): at 11:41

## 2022-07-23 RX ADMIN — Medication 600 MILLIGRAM(S): at 17:53

## 2022-07-23 RX ADMIN — SODIUM CHLORIDE 3 MILLILITER(S): 9 INJECTION INTRAMUSCULAR; INTRAVENOUS; SUBCUTANEOUS at 22:26

## 2022-07-23 RX ADMIN — Medication 600 MILLIGRAM(S): at 05:14

## 2022-07-23 RX ADMIN — Medication 975 MILLIGRAM(S): at 10:29

## 2022-07-23 RX ADMIN — Medication 975 MILLIGRAM(S): at 11:30

## 2022-07-23 NOTE — PROGRESS NOTE ADULT - SUBJECTIVE AND OBJECTIVE BOX
S: Patient doing well. Minimal lochia. Pain controlled.    O: Vital Signs Last 24 Hrs  T(C): 36.7 (2022 06:26), Max: 36.9 (2022 14:07)  T(F): 98.1 (2022 06:26), Max: 98.4 (2022 14:07)  HR: 79 (2022 06:26) (75 - 79)  BP: 101/67 (2022 06:26) (97/58 - 101/67)  BP(mean): --  RR: 16 (2022 06:26) (15 - 17)  SpO2: 99% (2022 06:26) (99% - 100%)    Parameters below as of 2022 06:26  Patient On (Oxygen Delivery Method): room air        Gen: NAD  Abd: soft, NT, ND, fundus firm below umbilicus  Lochia: moderate  Ext: no tenderness    Labs:                        11.2   8.47  )-----------( 217      ( 2022 01:45 )             32.7       A: 32y PPD#1 s/p  doing well.  Plan: Routine care dc with instructions for

## 2022-07-23 NOTE — LACTATION INITIAL EVALUATION - POTENTIAL FOR
ineffective breastfeeding/knowledge deficit/plugged ducts/feeding confusion/latch on difficulty/low supply

## 2022-07-23 NOTE — LACTATION INITIAL EVALUATION - LACTATION INTERVENTIONS
Primary RN made aware of consult and plan.  Feeding plan for  infants born 35 to 37 weeks gestational age reviewed and given to patient and father of the baby./initiate/review safe skin-to-skin/initiate/review hand expression/initiate/review pumping guidelines and safe milk handling/initiate/review techniques for position and latch/post discharge community resources provided/review techniques to increase milk supply/review techniques to manage sore nipples/engorgement/initiate/review breast massage/compression/reviewed components of an effective feeding and at least 8 effective feedings per day required/reviewed importance of monitoring infant diapers, the breastfeeding log, and minimum output each day/reviewed strategies to transition to breastfeeding only/reviewed benefits and recommendations for rooming in/reviewed feeding on demand/by cue at least 8 times a day

## 2022-07-24 VITALS
SYSTOLIC BLOOD PRESSURE: 113 MMHG | DIASTOLIC BLOOD PRESSURE: 78 MMHG | TEMPERATURE: 98 F | HEART RATE: 71 BPM | OXYGEN SATURATION: 100 % | RESPIRATION RATE: 18 BRPM

## 2022-07-24 RX ORDER — IBUPROFEN 200 MG
1 TABLET ORAL
Qty: 0 | Refills: 0 | DISCHARGE
Start: 2022-07-24

## 2022-07-24 RX ADMIN — Medication 600 MILLIGRAM(S): at 00:00

## 2022-07-24 RX ADMIN — Medication 600 MILLIGRAM(S): at 00:31

## 2022-07-24 RX ADMIN — Medication 600 MILLIGRAM(S): at 12:42

## 2022-07-24 RX ADMIN — SODIUM CHLORIDE 3 MILLILITER(S): 9 INJECTION INTRAMUSCULAR; INTRAVENOUS; SUBCUTANEOUS at 06:00

## 2022-07-24 RX ADMIN — FAMOTIDINE 20 MILLIGRAM(S): 10 INJECTION INTRAVENOUS at 12:12

## 2022-07-24 RX ADMIN — Medication 975 MILLIGRAM(S): at 16:20

## 2022-07-24 RX ADMIN — Medication 600 MILLIGRAM(S): at 06:07

## 2022-07-24 RX ADMIN — Medication 1 TABLET(S): at 12:12

## 2022-07-24 RX ADMIN — Medication 600 MILLIGRAM(S): at 12:12

## 2023-07-22 NOTE — OB PROVIDER TRIAGE NOTE - CURRENT PREGNANCY COMPLICATIONS, OB PROFILE
PAST MEDICAL HISTORY:  HTN (hypertension)      low lying placenta/Gestational Age less than 36 Weeks

## 2023-10-03 NOTE — OB RN TRIAGE NOTE - TEMPERATURE IN FAHRENHEIT (DEGREES F)
[facility] Consultation / Initial Visit      Today's Date: 10/3/2023    PCP: Adriel Mckeon MD  Referring Provider: Checo Lozano MD    INDICATION FOR CONSULTATION: Orthostatic Hypotension, Symptomatic       HPI:       82 year old female  With a pmhx Bile duct abnormality (2019), Breast cancer (CMD), Gastroesophageal reflux disease, HTN (hypertension), Hyperlipidemia, Osteoarthritis, Presence of IVC filter (2014), Pulmonary embolism (CMD) (2014), and Urinary incontinence who presented to [facility] on 9/28 for total knee arthroscopy (POD 5). Surgery went well without large recorded blood loss however, while doing PT, patient has exhibited orthostatic hypotension and dizziness upon standing. In 7/2023 she came to the ED due to dizziness and chills for 5 days without LOC, work up was negative for cardiac or neuro issue. She notes that she has not been dizzy with standing at home within the last few weeks and has never had an episode of syncope.     She was recently evalutated for chest pain with exertion in June 2023, Lupe-Scan with MPI was negative for reversible ischemia and EF was 65%. She has started to have more CP since March 2023 after her  passed away.     Upon evaluation this morning, patient states she had an episode of palpitations last night but she thinks this was due to the cafienated soda she had prior to bed. She has not been eating well since being in the hospital and has felt very thirsty over the last few days. She denies any dizziness or lightheadedness while supine and has had not CP or SOB since her admission.   ROS:     General: No fever or chills, No loss of appetite.    RS: No hemoptysis  GI: No melena or hematochezia  : No urinary disturbance  Derm: No skin disorders   Heme: No blood dyscrasias.  Remainder of 12 point systems reviewed and negative (or as mentioned in HPI)    Relevant Past Medical History:  Past Medical History:   Diagnosis Date   • Bile duct abnormality  2019    BLOCKED BILE DUCT-NEEDED BALLOON   • Breast cancer (CMD)     RADIATION   • Gastroesophageal reflux disease    • HTN (hypertension)    • Hyperlipidemia    • Osteoarthritis    • Presence of IVC filter 2014    WITH REMVOAL   • Pulmonary embolism (CMD) 2014   • Urinary incontinence       Past Surgical History:   Procedure Laterality Date   • Breast surgery Right 2018    LUMPECTOMY   • Cholecystectomy  08/2013   • Ercp  2019   • Eye surgery  2010    CATARACT REMOVAL   • Hand finger surgery unlisted Right 2010   • Hand surgery Left     FORK LIFT INJURY WITH SKIN GRAFT   • Hip surgery Right 2015    REVISION RIGHT HIP   • Joint replacement Right 2014    HIP   • Rev upper eyelid w excess skin  2009        Social History:  Social History     Tobacco Use   Smoking Status Never   Smokeless Tobacco Never     Social History     Substance and Sexual Activity   Alcohol Use Never     History   Drug Use Unknown       Family History:   Family History   Problem Relation Age of Onset   • Cancer, Breast Mother    • Cancer, Ovarian Sister        Inpatient Medications  Current Facility-Administered Medications   Medication Dose Route Frequency Provider Last Rate Last Admin   • potassium CHLORIDE (KLOR-CON M) lópez ER tablet 40 mEq  40 mEq Oral Once Noe Page MD       • Potassium Standard Replacement Protocol (Levels 3.5 and lower)   Does not apply See Admin Instructions Ramón Ruiz, CNP       • Potassium Replacement (Levels 3.6 - 4)   Does not apply See Admin Instructions AbRamón Horn, CNP       • Magnesium Standard Replacement Protocol   Does not apply See Admin Instructions Ramón Ruiz, CNP       • Phosphorus Standard Replacement Protocol   Does not apply See Admin Instructions Ramón Ruiz, CNP       • sodium chloride 0.9 % injection 2 mL  2 mL Intracatheter 2 times per day Stephen Doran PA-C   2 mL at 10/02/23 0747   • rivaroxaban (XARELTO) tablet 10 mg  10 mg Oral Daily Stephen Doran PA-C   10 mg at  10/02/23 0744   • [Held by provider] metoPROLOL succinate (TOPROL-XL) ER tablet 25 mg  25 mg Oral Daily Abu Ramón Palacios CNP       • pantoprazole (PROTONIX) EC tablet 40 mg  40 mg Oral QAM AC Abu Ramón Palacios CNP   40 mg at 10/03/23 0537   • rosuvastatin (CRESTOR) tablet 10 mg  10 mg Oral Daily Abu Ramón Palacios CNP   10 mg at 10/02/23 0744      Current Facility-Administered Medications   Medication Dose Route Frequency Provider Last Rate Last Admin   • sodium chloride 0.9% infusion   Intravenous Continuous Stephen Doran PA-C 100 mL/hr at 10/03/23 0004 New Bag at 10/03/23 0004      Current Facility-Administered Medications   Medication Dose Route Frequency Provider Last Rate Last Admin   • sodium chloride 0.9 % flush bag 25 mL  25 mL Intravenous PRN Abu Ramón Palacios CNP       • acetaminophen (TYLENOL) tablet 500 mg  500 mg Oral Q6H PRN Noe Page MD   500 mg at 09/30/23 2009   • sodium chloride 0.9 % flush bag 25 mL  25 mL Intravenous PRN Stephen Doran PA-C       • HYDROcodone-acetaminophen (NORCO) 5-325 MG per tablet 1 tablet  1 tablet Oral Q4H PRN Stephen Doran PA-C   1 tablet at 10/02/23 1515    Or   • HYDROcodone-acetaminophen (NORCO)  MG per tablet 1 tablet  1 tablet Oral Q4H PRN Stephen Doran PA-C   1 tablet at 10/03/23 0432   • [Held by provider] morphine injection 2 mg  2 mg Intravenous Q2H PRN Stephen Doran PA-C       • ondansetron (ZOFRAN ODT) disintegrating tablet 4 mg  4 mg Oral Q12H PRN Stephen Doran PA-C        Or   • ondansetron (ZOFRAN) injection 4 mg  4 mg Intravenous Q12H PRN Stephen Doran PA-C       • polyethylene glycol (MIRALAX) packet 17 g  17 g Oral Daily PRN Stephen Doran PA-C   17 g at 10/02/23 0838   • docusate sodium-sennosides (SENOKOT S) 50-8.6 MG 2 tablet  2 tablet Oral BID PRN Stephen Doran PA-C   2 tablet at 10/02/23 0530   • bisacodyl (DULCOLAX) suppository 10 mg  10 mg Rectal Daily PRN Stephen Doran PA-C       • magnesium hydroxide (MILK OF  MAGNESIA) 400 MG/5ML suspension 30 mL  30 mL Oral Daily PRN Stephen Doran PA-C       • diphenhydrAMINE (BENADRYL) capsule 25 mg  25 mg Oral Q4H PRN Stephen Doran PA-C       • ALPRAZolam (XANAX) tablet 0.25 mg  0.25 mg Oral Daily PRN Abu Inseir, Ramón, CNP   0.25 mg at 09/29/23 0027   • [Held by provider] cyclobenzaprine (FLEXERIL) tablet 5 mg  5 mg Oral Daily PRN Abu Inseir, Ramón, CNP       • loratadine (CLARITIN) tablet 10 mg  10 mg Oral Daily PRN Abu Inseir, Ramón, CNP       • triamterene-hydroCHLOROthiazide (MAXZIDE-25) 37.5-25 MG per tablet 1 tablet  1 tablet Oral Daily PRN Abu Inseir, Ramón, CNP            Allergy   ALLERGIES:   Allergen Reactions   • Ciprofloxacin Other (See Comments)     Per pt been so long she is unsure   • Metronidazole Other (See Comments)     Per pt been so long she is unsure   • Nsaids Other (See Comments)     Gi issues            Examination:      Vital Last Value 24 Hour Range   Temperature 98.8 °F (37.1 °C) (10/03/23 0428) Temp  Min: 98.6 °F (37 °C)  Max: 99.5 °F (37.5 °C)   Pulse 94 (10/03/23 0428) Pulse  Min: 82  Max: 94   Respiratory 17 (10/03/23 0532) Resp  Min: 16  Max: 18   Non-Invasive  Blood Pressure (!) 155/78 (10/03/23 0428) BP  Min: 138/81  Max: 155/78   Pulse Oximetry 98 % (10/03/23 0428) SpO2  Min: 97 %  Max: 99 %   Arterial   Blood Pressure   No data recorded     Tele: NA      Intake/Output Summary (Last 24 hours) at 10/3/2023 0806  Last data filed at 10/3/2023 0545  Gross per 24 hour   Intake --   Output 3900 ml   Net -3900 ml        Weight    09/27/23 0920 09/28/23 0901 09/28/23 1731   Weight: 74.4 kg (164 lb) 74.4 kg (164 lb) 74.3 kg (163 lb 12.8 oz)         GENERAL: No apparent distress  HEENT: Normocephalic.  Neck:  Supple neck.   Oral mucosa : Pink and moist.    Endocrine: There is no goiter.  CVS: Regular rate and rhythm.  Normal first and second heart tones.   Lung fields: Clear to auscultation bilaterally.   GI: Soft. Nontender, nondistended.    Lower  extremity: No cyanosis, clubbing or edema. large hematoma with induration on the left thigh, Left Knee swollen without evidence of erythema   Peripheral vascular: Both lower extremities are warm and well perfused.    Neuro: Awake and alert.  Nonfocal examination.  Psych: Appropriate mood and affect  Integumentary: Warm and Dry      Clinical Data:       The following were personally visualized and interpreted by me:    LABS:    CBC  Recent Labs   Lab 10/03/23  0445 10/02/23  0431 10/01/23  0445   WBC 4.4 4.7 5.9   HCT 27.5* 24.0* 27.9*   HGB 8.9* 7.8* 9.1*    152 124*       CMP  Recent Labs   Lab 10/03/23  0445 10/02/23  1212 10/02/23  0431 10/01/23  0445 09/30/23  0456 09/29/23  1455   SODIUM 139  --  140 140   < > 139   POTASSIUM 3.9 3.9 3.4 3.0*   < > 3.4   CHLORIDE 109  --  111* 109   < > 109   CO2 24  --  24 24   < > 23   GLUCOSE 91  --  95 80   < > 151*   BUN 6  --  7 7   < > 10   CREATININE 0.58  --  0.58 0.58   < > 0.79   CALCIUM 7.7*  --  7.7* 8.0*   < > 7.9*   TOTPROTEIN  --   --   --   --   --  5.0*   ALBUMIN  --   --   --   --   --  2.6*   BILIRUBIN  --   --   --   --   --  0.9   AST  --   --   --   --   --  104*   GPT  --   --   --   --   --  96*   ALKPT  --   --   --   --   --  73    < > = values in this interval not displayed.       Cardiac Labs  No results found    Lipid Panel  No results found    Coags  No results found    ABG  No results found    IMAGING:    ECG:   Encounter Date: 09/06/23   Electrocardiogram 12-Lead   Result Value    Ventricular Rate EKG/Min (BPM) 63    Atrial Rate (BPM) 63    CO-Interval (MSEC) 178    QRS-Interval (MSEC) 88    QT-Interval (MSEC) 410    QTc 420    R Axis (Degrees) -28    T Axis (Degrees) -17    REPORT TEXT      Normal sinus rhythm  Nonspecific T wave abnormality  Abnormal ECG  No previous ECGs available  Confirmed by ANGELA LANDRY MD ([id number]) on 9/6/2023 3:55:05 PM          Echocardiogram:  No results found for this or any previous visit.       Cath  Report:  No results found for this or any previous visit.      Assessment/Plans:   82 year old female  With a pmhx Bile duct abnormality (2019), Breast cancer (CMD), Gastroesophageal reflux disease, HTN (hypertension), Hyperlipidemia, Osteoarthritis, Presence of IVC filter (), Pulmonary embolism (CMD) (), and Urinary incontinence who presented to [facility] on  for total knee arthroscopy (POD 5).    #Symptomatic Orthostatic Hypotension   #Concern for Volume Status   - Patient has had symptomatic orthostatic hypotension after her surgery, this is likely due to hypovolemia as she has been urinating large volumes over the last few days however, given palpitations and age, can not completely rule out cardiac etiology such as aortic stenosis or possible arrhythmia, other etiologies include medications as patient is on metoprolol succinate 25 mg qd and Triamterene-hydrochlorothiazide 37.5-25 PRN for LE swelling at home  however she has not received any dosages of either medication since her admission on  making this less likely   - I+O Net 10/2: -5950, UOP: 8200  - TTE : ejection fraction greater than 55%  - LexiScan with MPI 2023: Negative for reversible ischemia, Estimated EF 65%    - PT Orthostats on 10/2:     - Supine: 125/67     - Standin/54, symptomatic  - Orthostats this morning on 10/3:      - Supine 127/85     - Standin/70   Plan   - Would recommend 48 hour telemetry while patient is inpatient   - TTE ordered, will follow results  - Holter Monitor on discharge to rule out arrhythmia   - Encouraged patient to eat and drink more while in the hospital   - Continue 100 cc/hr NS infusion   - Will hold off on midodrine for now as this appears to be due to her fluid status   - Patient's CP and palpations are likely anxiety induced given recent negative stress test, patient has expressed desire to speak with someone about the passing of her , would recommend outpatient psychology consult      #Acute Normocytic Anemia 2/2 to blood loss  - Patient Baseline Hgb in 8/2023: 14.1  - Hgb on 9/29 post left TKA, 8.9  - Physical Exam shows a large hematoma with induration on the left thigh, this is likely the sight of blood loss  Plan   - Continue to monitor hematoma on left thigh  - Patient hgb is currently stable, continue to monitor for acute drops  - Transfuse Hgb <7.0     #HX of HTN  - Home Medication: Metoprolol succinate 25 mg qd  Plan   - Hold in the setting of orthostatic hypotension     #HLD   - Home Medication: Rosuvastatin 10 mg qd   Plan   - Continue home medication     #HX of PE s/p IVC Filter in 2014   - Home Medication: Xarelto 10 mg qd   Plan   - No signs of acute thrombus or PE  - Continue Xarelto 10 mg qd     #POD 5, S/p Left TKA  - Ortho on consult  - Continue PT/OT   - Patient will likely require AG placement on discharge  - Continue Xarelto 10 mg qd for DVT proph  - Pain management per primary and ortho services     Thank you for allowing us to participate in this patient's care.  Please do not hesitate to call with any questions or concerns.    Seen and discussed with Dr. Zohaib Lim,    Internal Medicine PGY-1  [place]:         98.6

## 2024-07-23 NOTE — OB RN TRIAGE NOTE - FALL HARM RISK - CONCLUSION
Have Your Skin Lesions Been Treated?: not been treated Is This A New Presentation, Or A Follow-Up?: Skin Lesions How Severe Is Your Skin Lesion?: moderate Universal Safety Interventions

## 2024-12-11 NOTE — OB PROVIDER TRIAGE NOTE - NS_AFI_OBGYN_ALL_OB_FT
[FreeTextEntry1] : A portion of this note was written by [Jaquan Melvin] on 12/09/2024 acting as a scribe for Dr. Talamantes.   I have personally reviewed the chart and agree that the record accurately reflects my personal performance of the history, physical exam, assessment, and plan.  15.33

## 2024-12-30 ENCOUNTER — INPATIENT (INPATIENT)
Facility: HOSPITAL | Age: 34
LOS: 1 days | Discharge: HOME | End: 2025-01-01
Attending: SPECIALIST | Admitting: SPECIALIST

## 2024-12-30 VITALS
DIASTOLIC BLOOD PRESSURE: 85 MMHG | TEMPERATURE: 98 F | RESPIRATION RATE: 15 BRPM | SYSTOLIC BLOOD PRESSURE: 127 MMHG | HEART RATE: 74 BPM

## 2024-12-30 DIAGNOSIS — O24.419 GESTATIONAL DIABETES MELLITUS IN PREGNANCY, UNSPECIFIED CONTROL: ICD-10-CM

## 2024-12-30 LAB
ALBUMIN SERPL ELPH-MCNC: 3.8 G/DL — SIGNIFICANT CHANGE UP (ref 3.3–5)
ALP SERPL-CCNC: 208 U/L — HIGH (ref 40–120)
ALT FLD-CCNC: 32 U/L — SIGNIFICANT CHANGE UP (ref 4–33)
ANION GAP SERPL CALC-SCNC: 12 MMOL/L — SIGNIFICANT CHANGE UP (ref 7–14)
AST SERPL-CCNC: 32 U/L — SIGNIFICANT CHANGE UP (ref 4–32)
BASOPHILS # BLD AUTO: 0.02 K/UL — SIGNIFICANT CHANGE UP (ref 0–0.2)
BASOPHILS # BLD AUTO: 0.02 K/UL — SIGNIFICANT CHANGE UP (ref 0–0.2)
BASOPHILS NFR BLD AUTO: 0.3 % — SIGNIFICANT CHANGE UP (ref 0–2)
BASOPHILS NFR BLD AUTO: 0.3 % — SIGNIFICANT CHANGE UP (ref 0–2)
BILIRUB SERPL-MCNC: 0.7 MG/DL — SIGNIFICANT CHANGE UP (ref 0.2–1.2)
BLD GP AB SCN SERPL QL: NEGATIVE — SIGNIFICANT CHANGE UP
BUN SERPL-MCNC: 9 MG/DL — SIGNIFICANT CHANGE UP (ref 7–23)
CALCIUM SERPL-MCNC: 9.2 MG/DL — SIGNIFICANT CHANGE UP (ref 8.4–10.5)
CHLORIDE SERPL-SCNC: 105 MMOL/L — SIGNIFICANT CHANGE UP (ref 98–107)
CO2 SERPL-SCNC: 23 MMOL/L — SIGNIFICANT CHANGE UP (ref 22–31)
CREAT SERPL-MCNC: 0.44 MG/DL — LOW (ref 0.5–1.3)
EGFR: 132 ML/MIN/1.73M2 — SIGNIFICANT CHANGE UP
EOSINOPHIL # BLD AUTO: 0.01 K/UL — SIGNIFICANT CHANGE UP (ref 0–0.5)
EOSINOPHIL # BLD AUTO: 0.03 K/UL — SIGNIFICANT CHANGE UP (ref 0–0.5)
EOSINOPHIL NFR BLD AUTO: 0.1 % — SIGNIFICANT CHANGE UP (ref 0–6)
EOSINOPHIL NFR BLD AUTO: 0.5 % — SIGNIFICANT CHANGE UP (ref 0–6)
GLUCOSE BLDC GLUCOMTR-MCNC: 99 MG/DL — SIGNIFICANT CHANGE UP (ref 70–99)
GLUCOSE BLDC GLUCOMTR-MCNC: 99 MG/DL — SIGNIFICANT CHANGE UP (ref 70–99)
GLUCOSE SERPL-MCNC: 100 MG/DL — HIGH (ref 70–99)
HCT VFR BLD CALC: 37.7 % — SIGNIFICANT CHANGE UP (ref 34.5–45)
HCT VFR BLD CALC: 39.8 % — SIGNIFICANT CHANGE UP (ref 34.5–45)
HGB BLD-MCNC: 13.2 G/DL — SIGNIFICANT CHANGE UP (ref 11.5–15.5)
HGB BLD-MCNC: 13.8 G/DL — SIGNIFICANT CHANGE UP (ref 11.5–15.5)
IANC: 4.02 K/UL — SIGNIFICANT CHANGE UP (ref 1.8–7.4)
IANC: 6.46 K/UL — SIGNIFICANT CHANGE UP (ref 1.8–7.4)
IMM GRANULOCYTES NFR BLD AUTO: 0.3 % — SIGNIFICANT CHANGE UP (ref 0–0.9)
IMM GRANULOCYTES NFR BLD AUTO: 0.5 % — SIGNIFICANT CHANGE UP (ref 0–0.9)
LDH SERPL L TO P-CCNC: 184 U/L — SIGNIFICANT CHANGE UP (ref 135–225)
LYMPHOCYTES # BLD AUTO: 0.86 K/UL — LOW (ref 1–3.3)
LYMPHOCYTES # BLD AUTO: 1.47 K/UL — SIGNIFICANT CHANGE UP (ref 1–3.3)
LYMPHOCYTES # BLD AUTO: 11.1 % — LOW (ref 13–44)
LYMPHOCYTES # BLD AUTO: 24.5 % — SIGNIFICANT CHANGE UP (ref 13–44)
MCHC RBC-ENTMCNC: 30.1 PG — SIGNIFICANT CHANGE UP (ref 27–34)
MCHC RBC-ENTMCNC: 30.5 PG — SIGNIFICANT CHANGE UP (ref 27–34)
MCHC RBC-ENTMCNC: 34.7 G/DL — SIGNIFICANT CHANGE UP (ref 32–36)
MCHC RBC-ENTMCNC: 35 G/DL — SIGNIFICANT CHANGE UP (ref 32–36)
MCV RBC AUTO: 86.7 FL — SIGNIFICANT CHANGE UP (ref 80–100)
MCV RBC AUTO: 87.1 FL — SIGNIFICANT CHANGE UP (ref 80–100)
MONOCYTES # BLD AUTO: 0.38 K/UL — SIGNIFICANT CHANGE UP (ref 0–0.9)
MONOCYTES # BLD AUTO: 0.42 K/UL — SIGNIFICANT CHANGE UP (ref 0–0.9)
MONOCYTES NFR BLD AUTO: 4.9 % — SIGNIFICANT CHANGE UP (ref 2–14)
MONOCYTES NFR BLD AUTO: 7 % — SIGNIFICANT CHANGE UP (ref 2–14)
NEUTROPHILS # BLD AUTO: 4.02 K/UL — SIGNIFICANT CHANGE UP (ref 1.8–7.4)
NEUTROPHILS # BLD AUTO: 6.46 K/UL — SIGNIFICANT CHANGE UP (ref 1.8–7.4)
NEUTROPHILS NFR BLD AUTO: 67.2 % — SIGNIFICANT CHANGE UP (ref 43–77)
NEUTROPHILS NFR BLD AUTO: 83.3 % — HIGH (ref 43–77)
NRBC # BLD: 0 /100 WBCS — SIGNIFICANT CHANGE UP (ref 0–0)
NRBC # BLD: 0 /100 WBCS — SIGNIFICANT CHANGE UP (ref 0–0)
NRBC # FLD: 0 K/UL — SIGNIFICANT CHANGE UP (ref 0–0)
NRBC # FLD: 0 K/UL — SIGNIFICANT CHANGE UP (ref 0–0)
PLATELET # BLD AUTO: 155 K/UL — SIGNIFICANT CHANGE UP (ref 150–400)
PLATELET # BLD AUTO: 162 K/UL — SIGNIFICANT CHANGE UP (ref 150–400)
POTASSIUM SERPL-MCNC: 3.8 MMOL/L — SIGNIFICANT CHANGE UP (ref 3.5–5.3)
POTASSIUM SERPL-SCNC: 3.8 MMOL/L — SIGNIFICANT CHANGE UP (ref 3.5–5.3)
PROT SERPL-MCNC: 7.1 G/DL — SIGNIFICANT CHANGE UP (ref 6–8.3)
RBC # BLD: 4.33 M/UL — SIGNIFICANT CHANGE UP (ref 3.8–5.2)
RBC # BLD: 4.59 M/UL — SIGNIFICANT CHANGE UP (ref 3.8–5.2)
RBC # FLD: 13 % — SIGNIFICANT CHANGE UP (ref 10.3–14.5)
RBC # FLD: 13 % — SIGNIFICANT CHANGE UP (ref 10.3–14.5)
RH IG SCN BLD-IMP: POSITIVE — SIGNIFICANT CHANGE UP
RH IG SCN BLD-IMP: POSITIVE — SIGNIFICANT CHANGE UP
SODIUM SERPL-SCNC: 140 MMOL/L — SIGNIFICANT CHANGE UP (ref 135–145)
T PALLIDUM AB TITR SER: NEGATIVE — SIGNIFICANT CHANGE UP
URATE SERPL-MCNC: 3.5 MG/DL — SIGNIFICANT CHANGE UP (ref 2.5–7)
WBC # BLD: 5.99 K/UL — SIGNIFICANT CHANGE UP (ref 3.8–10.5)
WBC # BLD: 7.75 K/UL — SIGNIFICANT CHANGE UP (ref 3.8–10.5)
WBC # FLD AUTO: 5.99 K/UL — SIGNIFICANT CHANGE UP (ref 3.8–10.5)
WBC # FLD AUTO: 7.75 K/UL — SIGNIFICANT CHANGE UP (ref 3.8–10.5)

## 2024-12-30 RX ORDER — IBUPROFEN 200 MG
600 TABLET ORAL EVERY 6 HOURS
Refills: 0 | Status: DISCONTINUED | OUTPATIENT
Start: 2024-12-30 | End: 2024-12-31

## 2024-12-30 RX ORDER — OXYTOCIN IN 5 % DEXTROSE 20/500ML
167 PLASTIC BAG, INJECTION (ML) INTRAVENOUS
Qty: 30 | Refills: 0 | Status: DISCONTINUED | OUTPATIENT
Start: 2024-12-30 | End: 2024-12-31

## 2024-12-30 RX ORDER — SODIUM CHLORIDE 9 MG/ML
1000 INJECTION, SOLUTION INTRAVENOUS
Refills: 0 | Status: DISCONTINUED | OUTPATIENT
Start: 2024-12-30 | End: 2024-12-30

## 2024-12-30 RX ORDER — DIPHENHYDRAMINE HCL 25 MG
25 TABLET ORAL EVERY 6 HOURS
Refills: 0 | Status: DISCONTINUED | OUTPATIENT
Start: 2024-12-30 | End: 2025-01-01

## 2024-12-30 RX ORDER — HYDROCORTISONE 1 %
1 CREAM (GRAM) TOPICAL EVERY 6 HOURS
Refills: 0 | Status: DISCONTINUED | OUTPATIENT
Start: 2024-12-30 | End: 2025-01-01

## 2024-12-30 RX ORDER — SIMETHICONE 125 MG/1
80 CAPSULE, LIQUID FILLED ORAL EVERY 4 HOURS
Refills: 0 | Status: DISCONTINUED | OUTPATIENT
Start: 2024-12-30 | End: 2025-01-01

## 2024-12-30 RX ORDER — ACETAMINOPHEN 80 MG/.8ML
975 SOLUTION/ DROPS ORAL
Refills: 0 | Status: DISCONTINUED | OUTPATIENT
Start: 2024-12-30 | End: 2025-01-01

## 2024-12-30 RX ORDER — MAGNESIUM HYDROXIDE 400 MG/5ML
30 SUSPENSION, ORAL (FINAL DOSE FORM) ORAL
Refills: 0 | Status: DISCONTINUED | OUTPATIENT
Start: 2024-12-30 | End: 2025-01-01

## 2024-12-30 RX ORDER — OXYCODONE HCL 15 MG
5 TABLET ORAL ONCE
Refills: 0 | Status: DISCONTINUED | OUTPATIENT
Start: 2024-12-30 | End: 2025-01-01

## 2024-12-30 RX ORDER — PNV/FERROUS FUM/DOCUSATE/FOLIC 90-50-1MG
1 TABLET, EXTENDED RELEASE ORAL DAILY
Refills: 0 | Status: DISCONTINUED | OUTPATIENT
Start: 2024-12-30 | End: 2025-01-01

## 2024-12-30 RX ORDER — CHLORHEXIDINE GLUCONATE 1.2 MG/ML
1 RINSE ORAL DAILY
Refills: 0 | Status: DISCONTINUED | OUTPATIENT
Start: 2024-12-30 | End: 2024-12-30

## 2024-12-30 RX ORDER — WITCH HAZEL 0.5 ML/ML
1 SOLUTION TOPICAL EVERY 4 HOURS
Refills: 0 | Status: DISCONTINUED | OUTPATIENT
Start: 2024-12-30 | End: 2025-01-01

## 2024-12-30 RX ORDER — CITRIC ACID/SODIUM CITRATE 334-500MG
15 SOLUTION, ORAL ORAL EVERY 6 HOURS
Refills: 0 | Status: DISCONTINUED | OUTPATIENT
Start: 2024-12-30 | End: 2024-12-30

## 2024-12-30 RX ORDER — LANOLIN
1 OINTMENT (GRAM) TOPICAL EVERY 6 HOURS
Refills: 0 | Status: DISCONTINUED | OUTPATIENT
Start: 2024-12-30 | End: 2025-01-01

## 2024-12-30 RX ORDER — BENZOCAINE/MENTH/CETYLPYRD CL 15 MG-4 MG
1 LOZENGE MUCOUS MEMBRANE EVERY 6 HOURS
Refills: 0 | Status: DISCONTINUED | OUTPATIENT
Start: 2024-12-30 | End: 2025-01-01

## 2024-12-30 RX ORDER — SODIUM CHLORIDE 9 MG/ML
1000 INJECTION, SOLUTION INTRAMUSCULAR; INTRAVENOUS; SUBCUTANEOUS
Refills: 0 | Status: DISCONTINUED | OUTPATIENT
Start: 2024-12-30 | End: 2024-12-30

## 2024-12-30 RX ORDER — SODIUM CHLORIDE 9 MG/ML
3 INJECTION, SOLUTION INTRAMUSCULAR; INTRAVENOUS; SUBCUTANEOUS EVERY 8 HOURS
Refills: 0 | Status: DISCONTINUED | OUTPATIENT
Start: 2024-12-30 | End: 2025-01-01

## 2024-12-30 RX ORDER — CLOSTRIDIUM TETANI TOXOID ANTIGEN (FORMALDEHYDE INACTIVATED), CORYNEBACTERIUM DIPHTHERIAE TOXOID ANTIGEN (FORMALDEHYDE INACTIVATED), BORDETELLA PERTUSSIS TOXOID ANTIGEN (GLUTARALDEHYDE INACTIVATED), BORDETELLA PERTUSSIS FILAMENTOUS HEMAGGLUTININ ANTIGEN (FORMALDEHYDE INACTIVATED), BORDETELLA PERTUSSIS PERTACTIN ANTIGEN, AND BORDETELLA PERTUSSIS FIMBRIAE 2/3 ANTIGEN 5; 2; 2.5; 5; 3; 5 [LF]/.5ML; [LF]/.5ML; UG/.5ML; UG/.5ML; UG/.5ML; UG/.5ML
0.5 INJECTION, SUSPENSION INTRAMUSCULAR ONCE
Refills: 0 | Status: DISCONTINUED | OUTPATIENT
Start: 2024-12-30 | End: 2025-01-01

## 2024-12-30 RX ORDER — KETOROLAC TROMETHAMINE 30 MG/ML
30 INJECTION INTRAMUSCULAR; INTRAVENOUS ONCE
Refills: 0 | Status: DISCONTINUED | OUTPATIENT
Start: 2024-12-30 | End: 2025-01-01

## 2024-12-30 RX ORDER — OXYCODONE HCL 15 MG
5 TABLET ORAL
Refills: 0 | Status: DISCONTINUED | OUTPATIENT
Start: 2024-12-30 | End: 2025-01-01

## 2024-12-30 RX ADMIN — CHLORHEXIDINE GLUCONATE 1 APPLICATION(S): 1.2 RINSE ORAL at 11:42

## 2024-12-30 RX ADMIN — SODIUM CHLORIDE 125 MILLILITER(S): 9 INJECTION, SOLUTION INTRAVENOUS at 11:42

## 2024-12-30 RX ADMIN — ACETAMINOPHEN 975 MILLIGRAM(S): 80 SOLUTION/ DROPS ORAL at 23:30

## 2024-12-30 RX ADMIN — SODIUM CHLORIDE 3 MILLILITER(S): 9 INJECTION, SOLUTION INTRAMUSCULAR; INTRAVENOUS; SUBCUTANEOUS at 23:25

## 2024-12-30 RX ADMIN — ACETAMINOPHEN 975 MILLIGRAM(S): 80 SOLUTION/ DROPS ORAL at 22:30

## 2024-12-30 NOTE — OB PROVIDER H&P - ASSESSMENT
33 yo  @39w here for IOL for A2.      Plan  1. Admit to LND. Routine Labs. IVF.  2. IOL w/ BC, CB  3. Fetus: cat 1 tracing. VTX. Continuous EFM. Sono. No concerns.  4. Prenatal issues: A2, follow fingersticks  5. GBS neg  6. Pain: IV pain meds/epidural PRN    Patient discussed with attending physician, Dr. Phillips.    Christy Holden PGY1

## 2024-12-30 NOTE — DISCHARGE NOTE OB - CARE PLAN
1 Principal Discharge DX:	Normal vaginal delivery  Assessment and plan of treatment:	follow up 6 weeks   Principal Discharge DX:	Normal vaginal delivery  Assessment and plan of treatment:	After discharge, please stay on pelvic rest for 6 weeks, meaning no sexual intercourse, no tampons and no douching.  No driving for 2 weeks.  No lifting objects heavier than baby for 2 weeks.  Expect to have vaginal bleeding/spotting for up to six weeks.  The bleeding should get lighter and more white/light brown with time.  For bleeding soaking more than a pad an hour or passing clots greater than the size of your fist, come in to the   emergency department.  Follow up in the office in 6 weeks  Secondary Diagnosis:	Gestational HTN  Assessment and plan of treatment:	 your blood pressure monitor from [facility] on 1st floor of [facility]. Follow-up in your OB office within 1 week for a blood pressure check.   Please take your blood pressure 3x/day. Call your doctor if your blood pressure is 140 (top number) OR 90 (bottom number) or higher. Return to hospital if your blood pressure is 160 (top number)  (bottom number) or higher. Call your doctor if you experience symptoms such as headache, blurry vision, epigastric pain, or nausea/vomiting.  Secondary Diagnosis:	Gestational diabetes  Assessment and plan of treatment:	F/U 6 weeks for repeat oral glucose tolerance test

## 2024-12-30 NOTE — OB PROVIDER H&P - NSLOWPPHRISK_OBGYN_A_OB
No previous uterine incision/[place] Pregnancy/Less than or equal to 4 previous vaginal births/No known bleeding disorder/No history of postpartum hemorrhage

## 2024-12-30 NOTE — DISCHARGE NOTE OB - PATIENT PORTAL LINK FT
You can access the FollowMyHealth Patient Portal offered by [facility] by registering at the following website: [email or web address]. By joining [facility]’s FollowMyHealth portal, you will also be able to view your health information using other applications (apps) compatible with our system.

## 2024-12-30 NOTE — DISCHARGE NOTE OB - CARE PROVIDER_API CALL
Kailey Phillips  Obstetrics and Gynecology  [address] [place] [address], [address]  [place] [address]  Phone: [phone]  Fax: [phone]  Follow Up Time:

## 2024-12-30 NOTE — DISCHARGE NOTE OB - PLAN OF CARE
follow up 6 weeks  your blood pressure monitor from [facility] on 1st floor of [facility]. Follow-up in your OB office within 1 week for a blood pressure check.   Please take your blood pressure 3x/day. Call your doctor if your blood pressure is 140 (top number) OR 90 (bottom number) or higher. Return to hospital if your blood pressure is 160 (top number)  (bottom number) or higher. Call your doctor if you experience symptoms such as headache, blurry vision, epigastric pain, or nausea/vomiting. After discharge, please stay on pelvic rest for 6 weeks, meaning no sexual intercourse, no tampons and no douching.  No driving for 2 weeks.  No lifting objects heavier than baby for 2 weeks.  Expect to have vaginal bleeding/spotting for up to six weeks.  The bleeding should get lighter and more white/light brown with time.  For bleeding soaking more than a pad an hour or passing clots greater than the size of your fist, come in to the   emergency department.  Follow up in the office in 6 weeks F/U 6 weeks for repeat oral glucose tolerance test

## 2024-12-30 NOTE — OB PROVIDER H&P - HISTORY OF PRESENT ILLNESS
33 yo  @39w here for IOL for A2. -ctx, vb, lof +fm  GBS neg EFW: 2800 g extrapolated from office sono  8u insulin nightly    ObHx:   at 36+5 (labor) 4#12  GynHx: 11 cm L fibroid, exact location unk, otherwise denies  PMSH: denies  Med: 8u insulin nightly, PNV, progesterone earlier in the pregnancy, Vit D  All: NKDA  Pysch: denies  SH: denies  Willing to accept blood: yes

## 2024-12-30 NOTE — DISCHARGE NOTE OB - MEDICATION SUMMARY - MEDICATIONS TO TAKE
I will START or STAY ON the medications listed below when I get home from the hospital:    ibuprofen 600 mg oral tablet  -- 1 tab(s) by mouth every 6 hours as needed for  moderate pain  -- Indication: For Pain    acetaminophen 325 mg oral tablet  -- 3 tab(s) by mouth every 6 hours as needed for  mild pain  -- Indication: For Pain    multivitamin, prenatal  -- 1 tab(s) by mouth once a day  -- Indication: For Vitamin   I will START or STAY ON the medications listed below when I get home from the hospital:    Electronic Blood Pressure Monitor  -- Take your blood pressure three times a  day.  Notify MD if blood pressures are greater than 140/90.  Return to hospital for blood pressures greater than 160/100  -- Indication: For Gestational HTN    ibuprofen 600 mg oral tablet  -- 1 tab(s) by mouth every 6 hours as needed for  moderate pain  -- Indication: For Pain    acetaminophen 325 mg oral tablet  -- 3 tab(s) by mouth every 6 hours as needed for  mild pain  -- Indication: For Pain    multivitamin, prenatal  -- 1 tab(s) by mouth once a day  -- Indication: For Vitamin   I will START or STAY ON the medications listed below when I get home from the hospital:    Electronic Blood Pressure Monitor  -- Take your blood pressure three times a  day.  Notify MD if blood pressures are greater than 140/90.  Return to hospital for blood pressures greater than 160/100  -- Indication: For Gestational Hypertension    ibuprofen 600 mg oral tablet  -- 1 tab(s) by mouth every 6 hours as needed for  moderate pain  -- Indication: For Pain    acetaminophen 325 mg oral tablet  -- 3 tab(s) by mouth every 6 hours as needed for  mild pain  -- Indication: For Pain    multivitamin, prenatal  -- 1 tab(s) by mouth once a day  -- Indication: For Vitamin

## 2024-12-30 NOTE — DISCHARGE NOTE OB - NS MD DC FALL RISK RISK
For information on Fall & Injury Prevention, visit: [email or web address] OR  [email or web address] OR  https://www.cdc.gov/steadi/patient.html

## 2024-12-30 NOTE — CHART NOTE - NSCHARTNOTEFT_GEN_A_CORE
PA called by RN secondary to large blood clot expressed during fundal check. Pt seen at bedside resting comfortably. Denies CP, SOB, dizziness. States she is feeling well. Pain is well controlled.     Vitals: ICU Vital Signs Last 24 Hrs  T(C): 36.6 (30 Dec 2024 12:00), Max: 36.7 (30 Dec 2024 06:53)  T(F): 97.88 (30 Dec 2024 12:00), Max: 98.06 (30 Dec 2024 06:53)  HR: 69 (30 Dec 2024 17:31) (57 - 137)  BP: 144/86 (30 Dec 2024 17:19) (107/62 - 183/82)  BP(mean): --  ABP: --  ABP(mean): --  RR: 19 (30 Dec 2024 12:00) (15 - 21)  SpO2: 100% (30 Dec 2024 17:26) (85% - 100%)    General: A&O x3  Neuro: symmetrical facial features, no slurring of words  Lungs: breathing is unlabored  Extremities: full range of motion x4  Abdomen: Soft, nondistended, nontender  Uterus: firm, midline  Incision: clean, dry, intact   Bimanual exam: intrauterine blood clots expressed with improvement in MADELAINE tone, chetan weighed 164 with triton    Plan  1000 mcg rectal cytotec administered  Total QBL at this time appx. 480cc  Continue to monitor     Dw MD Jacqueline ROLON PA called by RN secondary to large blood clot expressed during fundal check. Pt seen at bedside resting comfortably. Denies CP, SOB, dizziness. States she is feeling well. Pain is well controlled.     Vitals: ICU Vital Signs Last 24 Hrs  T(C): 36.6 (30 Dec 2024 12:00), Max: 36.7 (30 Dec 2024 06:53)  T(F): 97.88 (30 Dec 2024 12:00), Max: 98.06 (30 Dec 2024 06:53)  HR: 69 (30 Dec 2024 17:31) (57 - 137)  BP: 144/86 (30 Dec 2024 17:19) (107/62 - 183/82)  BP(mean): --  ABP: --  ABP(mean): --  RR: 19 (30 Dec 2024 12:00) (15 - 21)  SpO2: 100% (30 Dec 2024 17:26) (85% - 100%)    General: A&O x3  Neuro: symmetrical facial features, no slurring of words  Lungs: breathing is unlabored  Extremities: full range of motion x4  Abdomen: Soft, nondistended, nontender  Uterus: firm, midline   Bimanual exam: intrauterine blood clots expressed with improvement in MADELAINE tone, chetan weighed 164 with triton    Plan  1000 mcg rectal Cytotec administered  Total QBL at this time appx. 480cc  Continue to monitor     Dw MD Jacqueline ROLON PA called by RN secondary to large blood clot expressed during fundal check. Pt seen at bedside resting comfortably. Denies CP, SOB, dizziness. States she is feeling well. Pain is well controlled.     Vitals: ICU Vital Signs Last 24 Hrs  T(C): 36.6 (30 Dec 2024 12:00), Max: 36.7 (30 Dec 2024 06:53)  T(F): 97.88 (30 Dec 2024 12:00), Max: 98.06 (30 Dec 2024 06:53)  HR: 69 (30 Dec 2024 17:31) (57 - 137)  BP: 144/86 (30 Dec 2024 17:19) (107/62 - 183/82)  BP(mean): --  ABP: --  ABP(mean): --  RR: 19 (30 Dec 2024 12:00) (15 - 21)  SpO2: 100% (30 Dec 2024 17:26) (85% - 100%)    General: A&O x3  Neuro: symmetrical facial features, no slurring of words  Lungs: breathing is unlabored  Extremities: full range of motion x4  Abdomen: Soft, nondistended, nontender  Uterus: firm, midline   Bimanual exam: intrauterine blood clots expressed with improvement in MADELAINE tone, chuchads weighed 164 with triton    Plan  1000 mcg rectal Cytotec administered  Total QBL at this time appx. 480cc  Continue to monitor   Labile blood pressures, hellp labs pending    Addendum @1800  HELLP labs resulted wnl  Pt meets criteria for gestational hypertension at this time  Denies RUQ pain, epigastric pain, headache, vision changes  Continue to monitor for signs and symptoms of severe preeclampsia  Pt educated on new diagnosis     Jorge ROLON

## 2024-12-30 NOTE — OB RN PATIENT PROFILE - FALL HARM RISK - PT AGE POPULATION HIDDEN
JOCELYN has a hx of nausea and prior hx of gastritis.  She is here for endoscopic evaluation.    Past Medical History:   Diagnosis Date    GERD (gastroesophageal reflux disease)     Stomach discomfort     gets stomach aches alot because she cnt digest her food. worked up by Dr. Velásquez 3 years ago--diagnosed with slow transition she has not follewd up since doctor .     Past Surgical History:   Procedure Laterality Date    ESOPHAGOGASTRODUODENOSCOPY      MANDIBLE SURGERY      TEAR DUCT SURGERY       PHYSICAL EXAM:  Vital signs reviewed.  General appearance: Awake and alert, NAD, well hydrated and well nourished, with no pallor or jaundice, afebrile, appropriate for age.  Head: Normocephalic  Eyes: No erythema or discharge  ENT: MMM  Abdomen: Not distended, soft, not tender with no palpable masses  Extremities: Symmetric, well perfused    IMPRESSION:  Nausea    PLAN:  - EGD with biopsies       Adult

## 2024-12-30 NOTE — OB PROVIDER H&P - NSHPPHYSICALEXAM_GEN_ALL_CORE
Gen: resting comfortably in bed  Pulm: breathing comfortably on room air  Abd: Gravid   Ext: moving all ext w ease    VE: 0.5/50/-3  Sono: vtx  FHT: 135 baseline mod variability +accel -decel  [place] acontractile

## 2024-12-30 NOTE — OB PROVIDER LABOR PROGRESS NOTE - ASSESSMENT
Plan    Epidural for pain management  Labile blood pressures, HELLP labs  Discontinue Cytotec  Start Pitocin  Anticipate vaginal delivery    Jorge Phillips

## 2024-12-30 NOTE — OB RN DELIVERY SUMMARY - NSSELHIDDEN_OBGYN_ALL_OB_FT
[NS_DeliveryAttending1_OBGYN_ALL_OB_FT:[id number]],[NS_DeliveryAssist1_OBGYN_ALL_OB_FT:[id number]],[NS_DeliveryRN_OBGYN_ALL_OB_FT:[id number]]

## 2024-12-30 NOTE — OB RN DELIVERY SUMMARY - NS_BABYDISPOA_OBGYN_ALL_OB
Nursing Transfer Note      3/14/2017     Transfer To: [id number]    Transfer via bed    Transfer with cardiac monitoring (telepack placed on patient before transfer)    Transported by: RN and PCT    Medicines sent: No medications sent    Chart send with patient: Yes    Notified: friend    Patient reassessed at: 3/14/2017 1422    Upon arrival to floor: cardiac monitor applied, patient oriented to room, call bell in reach and bed in lowest position   Mother's Bedside/Non-

## 2024-12-30 NOTE — OB RN DELIVERY SUMMARY - NS_SEPSISRSKCALC_OBGYN_ALL_OB_FT
EOS calculated successfully. EOS Risk Factor: 0.5/1000 live births ([facility] national incidence); GA=39w;Temp=98.06; ROM=0.317; GBS='Negative'; Antibiotics='No antibiotics or any antibiotics < 2 hrs prior to birth'

## 2024-12-30 NOTE — DISCHARGE NOTE OB - FINANCIAL ASSISTANCE
[facility] provides services at a reduced cost to those who are determined to be eligible through [facility]’s financial assistance program. Information regarding [facility]’s financial assistance program can be found by going to [email or web address] or by calling [phone].

## 2024-12-31 LAB
ADD ON TEST-SPECIMEN IN LAB: SIGNIFICANT CHANGE UP
BASOPHILS # BLD AUTO: 0.02 K/UL — SIGNIFICANT CHANGE UP (ref 0–0.2)
BASOPHILS NFR BLD AUTO: 0.3 % — SIGNIFICANT CHANGE UP (ref 0–2)
EOSINOPHIL # BLD AUTO: 0.04 K/UL — SIGNIFICANT CHANGE UP (ref 0–0.5)
EOSINOPHIL NFR BLD AUTO: 0.5 % — SIGNIFICANT CHANGE UP (ref 0–6)
HCT VFR BLD CALC: 35.3 % — SIGNIFICANT CHANGE UP (ref 34.5–45)
HCT VFR BLD CALC: 35.3 % — SIGNIFICANT CHANGE UP (ref 34.5–45)
HGB BLD-MCNC: 12.4 G/DL — SIGNIFICANT CHANGE UP (ref 11.5–15.5)
HGB BLD-MCNC: 12.4 G/DL — SIGNIFICANT CHANGE UP (ref 11.5–15.5)
IANC: 5.5 K/UL — SIGNIFICANT CHANGE UP (ref 1.8–7.4)
IMM GRANULOCYTES NFR BLD AUTO: 0.3 % — SIGNIFICANT CHANGE UP (ref 0–0.9)
LYMPHOCYTES # BLD AUTO: 1.61 K/UL — SIGNIFICANT CHANGE UP (ref 1–3.3)
LYMPHOCYTES # BLD AUTO: 20.9 % — SIGNIFICANT CHANGE UP (ref 13–44)
MCHC RBC-ENTMCNC: 29.7 PG — SIGNIFICANT CHANGE UP (ref 27–34)
MCHC RBC-ENTMCNC: 35.1 G/DL — SIGNIFICANT CHANGE UP (ref 32–36)
MCV RBC AUTO: 89.2 FL — SIGNIFICANT CHANGE UP (ref 80–100)
MONOCYTES # BLD AUTO: 0.52 K/UL — SIGNIFICANT CHANGE UP (ref 0–0.9)
MONOCYTES NFR BLD AUTO: 6.7 % — SIGNIFICANT CHANGE UP (ref 2–14)
NEUTROPHILS # BLD AUTO: 5.5 K/UL — SIGNIFICANT CHANGE UP (ref 1.8–7.4)
NEUTROPHILS NFR BLD AUTO: 71.3 % — SIGNIFICANT CHANGE UP (ref 43–77)
NRBC # BLD: 0 /100 WBCS — SIGNIFICANT CHANGE UP (ref 0–0)
NRBC # FLD: 0 K/UL — SIGNIFICANT CHANGE UP (ref 0–0)
PLATELET # BLD AUTO: 135 K/UL — LOW (ref 150–400)
RBC # BLD: 4.18 M/UL — SIGNIFICANT CHANGE UP (ref 3.8–5.2)
RBC # FLD: 13.2 % — SIGNIFICANT CHANGE UP (ref 10.3–14.5)
WBC # BLD: 7.71 K/UL — SIGNIFICANT CHANGE UP (ref 3.8–10.5)
WBC # FLD AUTO: 7.71 K/UL — SIGNIFICANT CHANGE UP (ref 3.8–10.5)

## 2024-12-31 RX ORDER — IBUPROFEN 200 MG
600 TABLET ORAL EVERY 6 HOURS
Refills: 0 | Status: COMPLETED | OUTPATIENT
Start: 2024-12-31 | End: 2025-11-29

## 2024-12-31 RX ORDER — PNV/FERROUS FUM/DOCUSATE/FOLIC 90-50-1MG
1 TABLET, EXTENDED RELEASE ORAL
Qty: 0 | Refills: 0 | DISCHARGE
Start: 2024-12-31

## 2024-12-31 RX ORDER — IBUPROFEN 200 MG
600 TABLET ORAL EVERY 6 HOURS
Refills: 0 | Status: DISCONTINUED | OUTPATIENT
Start: 2024-12-31 | End: 2025-01-01

## 2024-12-31 RX ORDER — ACETAMINOPHEN 80 MG/.8ML
3 SOLUTION/ DROPS ORAL
Qty: 0 | Refills: 0 | DISCHARGE
Start: 2024-12-31

## 2024-12-31 RX ORDER — IBUPROFEN 200 MG
1 TABLET ORAL
Qty: 0 | Refills: 0 | DISCHARGE
Start: 2024-12-31

## 2024-12-31 RX ADMIN — SODIUM CHLORIDE 3 MILLILITER(S): 9 INJECTION, SOLUTION INTRAMUSCULAR; INTRAVENOUS; SUBCUTANEOUS at 14:26

## 2024-12-31 RX ADMIN — ACETAMINOPHEN 975 MILLIGRAM(S): 80 SOLUTION/ DROPS ORAL at 09:34

## 2024-12-31 RX ADMIN — SODIUM CHLORIDE 3 MILLILITER(S): 9 INJECTION, SOLUTION INTRAMUSCULAR; INTRAVENOUS; SUBCUTANEOUS at 05:45

## 2024-12-31 RX ADMIN — ACETAMINOPHEN 975 MILLIGRAM(S): 80 SOLUTION/ DROPS ORAL at 20:30

## 2024-12-31 RX ADMIN — Medication 600 MILLIGRAM(S): at 12:23

## 2024-12-31 RX ADMIN — Medication 1 TABLET(S): at 11:36

## 2024-12-31 RX ADMIN — ACETAMINOPHEN 975 MILLIGRAM(S): 80 SOLUTION/ DROPS ORAL at 10:30

## 2024-12-31 RX ADMIN — Medication 600 MILLIGRAM(S): at 11:37

## 2024-12-31 RX ADMIN — ACETAMINOPHEN 975 MILLIGRAM(S): 80 SOLUTION/ DROPS ORAL at 19:57

## 2024-12-31 RX ADMIN — SIMETHICONE 80 MILLIGRAM(S): 125 CAPSULE, LIQUID FILLED ORAL at 09:37

## 2024-12-31 NOTE — LACTATION INITIAL EVALUATION - INTERVENTION OUTCOME
Assisted with deep latch and positioning in cross cradle on the left side. Infant latched on with a wide gape and a RS with a SSB pattern. Encouraged to breastfeed the baby on demand based on cues and at least 8-12 times in a day. Instructed to log feedings along with wet and dirty diapers. Mother verbalized understanding of all information and was encouraged to call for breastfeeding assistance as needed./verbalizes understanding/good return demonstration

## 2024-12-31 NOTE — PROGRESS NOTE ADULT - SUBJECTIVE AND OBJECTIVE BOX
S: 33yo PPD#1 s/p  c/b GDMA 2 and gHTN. Blood pressures are controlled w/o medication, PCR unavailable, PEC labs wnl. Denies s/s of PEC. Total , which includes post-partum clots that were passed spontaneously and expressed; Patient  was treated with Cytotec 1gm rectally. Vaginal bleeding moderate this AM w/o clots/trickling/gushes. Patient  c/o b/l lower extremity pain which she stated is improving. Pain is likely muscular as there is no erythema/edema/calf tenderness or decrease in mobility and therefore low suspicion for DVT. Patient feels well. Pain is well controlled. She is tolerating a regular diet and passing flatus. She is voiding spontaneously, and ambulating without difficulty. Denies CP/SOB. Denies lightheadedness/dizziness. Denies N/V, headache, visual change, RUQ pain.     O:  Vitals:  Vital Signs Last 24 Hrs  T(C): 36.6 (31 Dec 2024 06:37), Max: 36.6 (30 Dec 2024 12:00)  T(F): 97.9 (31 Dec 2024 06:37), Max: 97.9 (31 Dec 2024 06:37)  HR: 87 (31 Dec 2024 06:37) (57 - 96)  BP: 115/84 (31 Dec 2024 06:37) (107/62 - 183/82)  BP(mean): --  RR: 17 (31 Dec 2024 06:37) (17 - 20)  SpO2: 100% (31 Dec 2024 06:37) (85% - 100%)    Parameters above as of 31 Dec 2024 06:37  Patient On (Oxygen Delivery Method): room air        MEDICATIONS  (STANDING):  acetaminophen     Tablet .. 975 milliGRAM(s) Oral <User Schedule>  diphtheria/tetanus/pertussis (acellular) Vaccine (Adacel) 0.5 milliLiter(s) IntraMuscular once  ibuprofen  Tablet. 600 milliGRAM(s) Oral every 6 hours  ketorolac   Injectable 30 milliGRAM(s) IV Push once  prenatal multivitamin 1 Tablet(s) Oral daily  sodium chloride 0.9% lock flush 3 milliLiter(s) IV Push every 8 hours      Labs:  Blood type: A Positive  Rubella IgG: RPR: Negative                          12.4   7.71 >-----------< 135[L]    (  @ 05:39 )             35.3                        13.8   7.75 >-----------< 162    (  @ 16:15 )             39.8                        13.2   5.99 >-----------< 155    (  @ 07:20 )             37.7    24 @ 16:15      140  |  105  |  9   ----------------------------<  100[H]  3.8   |  23  |  0.44[L]        Ca    9.2      30 Dec 2024 16:15    TPro  7.1  /  Alb  3.8  /  TBili  0.7  /  DBili  x   /  AST  32  /  ALT  32  /  AlkPhos  208[H]  24 @ 16:15      Physical Exam:  General: NAD  Abdomen: soft, non-tender, non-distended, fundus firm  Vaginal: Lochia moderate w/o clots/trickling/gushes  Extremities: No erythema/edema/ calf tenderness.  B/L lower extremity muscular pain reported    A/P: 33yo PPD#1 s/p  c/b GDMA 2 and gHTN. Blood pressures are controlled w/o medication, PCR unavailable, PEC labs wnl. Denies s/s of PEC. Total , which includes post-partum clots that were passed spontaneously and expressed; Patient  was treated with Cytotec 1gm rectally. Vaginal bleeding moderate this AM w/o clots/trickling/gushes. Patient  c/o b/l lower extremity pain which she stated is improving. Pain is likely muscular as there is no erythema/edema/calf tenderness or decrease in mobility and therefore low suspicion for DVT.  Patient is currently stable and doing well post-partum.     #gHTN  -Blood pressures currently controlled w/o medication  -PCR unavailable; PEC labs wnl  -Denies s/s of PEC  -Script sent to [facility] for B/P monitor  -instructions given on BP monitoring; TID; call MD office if greater than 140/90; report to emergency room is greater than 160/110  -reviewed signs and symptoms related to preeclampsia with patient such as HA, Change in vision, N/V. RUQ pain   -keep log BP's to present to MD during appointment for BP check in 1 week  -All questions answered, patient verbalized understanding     #GDMA2  -F/U 6-8 weeks for repeat OGTT    #Post-Partum   - F/U AM CBC  - Pain well controlled, continue current pain regimen  - Increase ambulation, SCDs when not ambulating  - Continue regular diet  - D/C plan -tomorrow if stable. F/U within 1 week for B/P check    Patient is [place] Speaking   name: Mary Beth   # [id number]    JOEL Gamble-BC S: 33yo PPD#1 s/p  c/b GDMA2 and gHTN. Blood pressures are controlled w/o medication, PCR unavailable, PEC labs wnl. Denies s/s of PEC. Total , which includes post-partum clots that were passed spontaneously and expressed; Patient was treated with Cytotec 1gm rectally. H/H 13.2/37.7->12.4/35.3. Vaginal bleeding moderate this AM w/o clots/trickling/gushes. Patient c/o b/l lower extremity pain which she stated is improving. Pain is likely muscular as there is no erythema/edema/calf tenderness or decrease in mobility and therefore low suspicion for DVT. Patient feels well. Pain is well controlled. She is tolerating a regular diet and passing flatus. She is voiding spontaneously, and ambulating without difficulty. Denies CP/SOB. Denies lightheadedness/dizziness. Denies N/V, headache, visual change, RUQ pain.     O:  Vitals:  Vital Signs Last 24 Hrs  T(C): 36.6 (31 Dec 2024 06:37), Max: 36.6 (30 Dec 2024 12:00)  T(F): 97.9 (31 Dec 2024 06:37), Max: 97.9 (31 Dec 2024 06:37)  HR: 87 (31 Dec 2024 06:37) (57 - 96)  BP: 115/84 (31 Dec 2024 06:37) (107/62 - 183/82)  BP(mean): --  RR: 17 (31 Dec 2024 06:37) (17 - 20)  SpO2: 100% (31 Dec 2024 06:37) (85% - 100%)    Parameters above as of 31 Dec 2024 06:37  Patient On (Oxygen Delivery Method): room air        MEDICATIONS  (STANDING):  acetaminophen     Tablet .. 975 milliGRAM(s) Oral <User Schedule>  diphtheria/tetanus/pertussis (acellular) Vaccine (Adacel) 0.5 milliLiter(s) IntraMuscular once  ibuprofen  Tablet. 600 milliGRAM(s) Oral every 6 hours  ketorolac   Injectable 30 milliGRAM(s) IV Push once  prenatal multivitamin 1 Tablet(s) Oral daily  sodium chloride 0.9% lock flush 3 milliLiter(s) IV Push every 8 hours      Labs:  Blood type: A Positive  Rubella IgG: RPR: Negative                          12.4   7.71 >-----------< 135[L]    (  @ 05:39 )             35.3                        13.8   7.75 >-----------< 162    (  @ 16:15 )             39.8                        13.2   5.99 >-----------< 155    (  @ 07:20 )             37.7    24 @ 16:15      140  |  105  |  9   ----------------------------<  100[H]  3.8   |  23  |  0.44[L]        Ca    9.2      30 Dec 2024 16:15    TPro  7.1  /  Alb  3.8  /  TBili  0.7  /  DBili  x   /  AST  32  /  ALT  32  /  AlkPhos  208[H]  24 @ 16:15      Physical Exam:  General: NAD  Abdomen: soft, non-tender, non-distended, fundus firm  Vaginal: Lochia moderate w/o clots/trickling/gushes  Extremities: No erythema/edema/ calf tenderness.  B/L lower extremity muscular pain reported    A/P: 33yo PPD#1 s/p  c/b GDMA2 and gHTN. Blood pressures are controlled w/o medication, PCR unavailable, PEC labs wnl. Denies s/s of PEC. Total , which includes post-partum clots that were passed spontaneously and expressed; Patient  was treated with Cytotec 1gm rectally. H/H 13.2/37.7->12.4/35.3. Vaginal bleeding moderate this AM w/o clots/trickling/gushes. Patient  c/o b/l lower extremity pain which she stated is improving. Pain is likely muscular as there is no erythema/edema/calf tenderness or decrease in mobility and therefore low suspicion for DVT.  Patient is currently stable and doing well post-partum.     #gHTN  -Blood pressures currently controlled w/o medication  -PCR unavailable; PEC labs wnl  -Denies s/s of PEC  -Script sent to [facility] for B/P monitor  -instructions given on BP monitoring; TID; call MD office if greater than 140/90; report to emergency room is greater than 160/110  -reviewed signs and symptoms related to preeclampsia with patient such as HA, Change in vision, N/V. RUQ pain   -keep log BP's to present to MD during appointment for BP check in 1 week  -All questions answered, patient verbalized understanding     #GDMA2  -F/U 6-8 weeks for repeat OGTT    #Post-Partum   - F/U AM CBC  - Pain well controlled, continue current pain regimen  - Increase ambulation, SCDs when not ambulating  - Continue regular diet  - D/C plan -tomorrow if stable. F/U within 1 week for B/P check    Patient is [place] Speaking   name: Mary Beth   # [id number]    JOEL Gamble-BC S: 35yo PPD#1 s/p  c/b GDMA2 and gHTN. Blood pressures are controlled w/o medication, PCR unavailable, PEC labs wnl. Denies s/s of PEC. Total , which includes post-partum clots that were passed spontaneously and expressed; Patient was treated with Cytotec 1gm rectally. H/H 13.2/37.7->12.4/35.3. Vaginal bleeding moderate this AM w/o clots/trickling/gushes. Patient c/o b/l lower extremity pain which she stated is improving. Pain is likely muscular as there is no erythema/edema/calf tenderness or decrease in mobility and therefore low suspicion for DVT. Patient feels well. Pain is well controlled. She is tolerating a regular diet and passing flatus. She is voiding spontaneously, and ambulating without difficulty. Denies CP/SOB. Denies lightheadedness/dizziness. Denies N/V, headache, visual change, RUQ pain.     O:  Vitals:  Vital Signs Last 24 Hrs  T(C): 36.6 (31 Dec 2024 06:37), Max: 36.6 (30 Dec 2024 12:00)  T(F): 97.9 (31 Dec 2024 06:37), Max: 97.9 (31 Dec 2024 06:37)  HR: 87 (31 Dec 2024 06:37) (57 - 96)  BP: 115/84 (31 Dec 2024 06:37) (107/62 - 183/82)  BP(mean): --  RR: 17 (31 Dec 2024 06:37) (17 - 20)  SpO2: 100% (31 Dec 2024 06:37) (85% - 100%)    Parameters above as of 31 Dec 2024 06:37  Patient On (Oxygen Delivery Method): room air        MEDICATIONS  (STANDING):  acetaminophen     Tablet .. 975 milliGRAM(s) Oral <User Schedule>  diphtheria/tetanus/pertussis (acellular) Vaccine (Adacel) 0.5 milliLiter(s) IntraMuscular once  ibuprofen  Tablet. 600 milliGRAM(s) Oral every 6 hours  ketorolac   Injectable 30 milliGRAM(s) IV Push once  prenatal multivitamin 1 Tablet(s) Oral daily  sodium chloride 0.9% lock flush 3 milliLiter(s) IV Push every 8 hours      Labs:  Blood type: A Positive  Rubella IgG: RPR: Negative                          12.4   7.71 >-----------< 135[L]    (  @ 05:39 )             35.3                        13.8   7.75 >-----------< 162    (  @ 16:15 )             39.8                        13.2   5.99 >-----------< 155    (  @ 07:20 )             37.7    24 @ 16:15      140  |  105  |  9   ----------------------------<  100[H]  3.8   |  23  |  0.44[L]        Ca    9.2      30 Dec 2024 16:15    TPro  7.1  /  Alb  3.8  /  TBili  0.7  /  DBili  x   /  AST  32  /  ALT  32  /  AlkPhos  208[H]  24 @ 16:15      Physical Exam:  General: NAD  Abdomen: soft, non-tender, non-distended, fundus firm  Vaginal: Lochia moderate w/o clots/trickling/gushes  Extremities: No erythema/edema/ calf tenderness.  B/L lower extremity muscular pain reported    A/P: 35yo PPD#1 s/p  c/b GDMA2 and gHTN. Blood pressures are controlled w/o medication, PCR unavailable, PEC labs wnl. Denies s/s of PEC. Total , which includes post-partum clots that were passed spontaneously and expressed; Patient  was treated with Cytotec 1gm rectally. H/H 13.2/37.7->12.4/35.3. Vaginal bleeding moderate this AM w/o clots/trickling/gushes. Patient  c/o b/l lower extremity pain which she stated is improving. Pain is likely muscular as there is no erythema/edema/calf tenderness or decrease in mobility and therefore low suspicion for DVT.  Patient is currently stable and doing well post-partum.     #gHTN  -Blood pressures currently controlled w/o medication  -PCR unavailable; PEC labs wnl  -Denies s/s of PEC  -Script sent to [facility] for B/P monitor  -instructions given on BP monitoring; TID; call MD office if greater than 140/90; report to emergency room is greater than 160/110  -reviewed signs and symptoms related to preeclampsia with patient such as HA, Change in vision, N/V. RUQ pain   -keep log BP's to present to MD during appointment for BP check in 1 week  -All questions answered, patient verbalized understanding     #GDMA2  -F/U 6-8 weeks for repeat OGTT    #Post-Partum   - Pain well controlled, continue current pain regimen  - Increase ambulation, SCDs when not ambulating  - Continue regular diet  - D/C plan -tomorrow if stable. F/U within 1 week for B/P check    Patient is [place] Speaking   name: Mary Beth   # [id number]    JOEL Gamble-BC

## 2025-01-01 VITALS
SYSTOLIC BLOOD PRESSURE: 132 MMHG | RESPIRATION RATE: 15 BRPM | TEMPERATURE: 98 F | DIASTOLIC BLOOD PRESSURE: 90 MMHG | OXYGEN SATURATION: 100 % | HEART RATE: 86 BPM

## 2025-01-01 RX ADMIN — Medication 600 MILLIGRAM(S): at 05:21

## 2025-01-01 RX ADMIN — Medication 600 MILLIGRAM(S): at 14:41

## 2025-01-01 RX ADMIN — SODIUM CHLORIDE 3 MILLILITER(S): 9 INJECTION, SOLUTION INTRAMUSCULAR; INTRAVENOUS; SUBCUTANEOUS at 06:40

## 2025-01-01 RX ADMIN — Medication 600 MILLIGRAM(S): at 15:26

## 2025-01-01 RX ADMIN — SODIUM CHLORIDE 3 MILLILITER(S): 9 INJECTION, SOLUTION INTRAMUSCULAR; INTRAVENOUS; SUBCUTANEOUS at 16:26

## 2025-01-01 RX ADMIN — Medication 600 MILLIGRAM(S): at 06:00
